# Patient Record
Sex: FEMALE | Race: BLACK OR AFRICAN AMERICAN | Employment: OTHER | URBAN - METROPOLITAN AREA
[De-identification: names, ages, dates, MRNs, and addresses within clinical notes are randomized per-mention and may not be internally consistent; named-entity substitution may affect disease eponyms.]

---

## 2017-01-03 ENCOUNTER — HOSPITAL ENCOUNTER (OUTPATIENT)
Dept: PULMONOLOGY | Age: 52
Discharge: HOME | End: 2017-01-03
Attending: RADIOLOGY | Admitting: RADIOLOGY

## 2017-01-03 DIAGNOSIS — J45.909 UNCOMPLICATED ASTHMA, UNSPECIFIED ASTHMA SEVERITY: ICD-10-CM

## 2017-01-03 DIAGNOSIS — J45.909 UNCOMPLICATED ASTHMA: ICD-10-CM

## 2017-01-03 RX ORDER — ALBUTEROL SULFATE 90 UG/1
4 AEROSOL, METERED RESPIRATORY (INHALATION) ONCE
Status: COMPLETED | OUTPATIENT
Start: 2017-01-03 | End: 2017-01-03

## 2017-01-03 RX ADMIN — ALBUTEROL SULFATE 4 PUFF: 90 AEROSOL, METERED RESPIRATORY (INHALATION) at 13:50

## 2017-01-04 PROCEDURE — 94729 DIFFUSING CAPACITY: CPT | Performed by: INTERNAL MEDICINE

## 2017-01-04 PROCEDURE — 94727 GAS DIL/WSHOT DETER LNG VOL: CPT | Performed by: INTERNAL MEDICINE

## 2017-01-04 PROCEDURE — 94060 EVALUATION OF WHEEZING: CPT | Performed by: INTERNAL MEDICINE

## 2017-01-18 ENCOUNTER — OFFICE VISIT (OUTPATIENT)
Dept: BARIATRICS/WEIGHT MGMT | Age: 52
End: 2017-01-18

## 2017-01-18 VITALS
BODY MASS INDEX: 48.82 KG/M2 | DIASTOLIC BLOOD PRESSURE: 71 MMHG | SYSTOLIC BLOOD PRESSURE: 118 MMHG | WEIGHT: 293 LBS | HEIGHT: 65 IN | HEART RATE: 98 BPM

## 2017-01-18 DIAGNOSIS — I10 ESSENTIAL HYPERTENSION: ICD-10-CM

## 2017-01-18 DIAGNOSIS — E66.01 MORBID OBESITY WITH BMI OF 50.0-59.9, ADULT (HCC): Primary | ICD-10-CM

## 2017-01-18 DIAGNOSIS — E11.9 TYPE 2 DIABETES MELLITUS WITHOUT COMPLICATION, WITH LONG-TERM CURRENT USE OF INSULIN (HCC): ICD-10-CM

## 2017-01-18 DIAGNOSIS — Z79.4 TYPE 2 DIABETES MELLITUS WITHOUT COMPLICATION, WITH LONG-TERM CURRENT USE OF INSULIN (HCC): ICD-10-CM

## 2017-01-18 DIAGNOSIS — E78.5 HYPERLIPIDEMIA, UNSPECIFIED HYPERLIPIDEMIA TYPE: ICD-10-CM

## 2017-01-18 PROCEDURE — 99214 OFFICE O/P EST MOD 30 MIN: CPT | Performed by: SURGERY

## 2017-01-18 RX ORDER — BUPROPION HYDROCHLORIDE 150 MG/1
TABLET ORAL
Refills: 2 | COMMUNITY
Start: 2017-01-10 | End: 2018-08-31

## 2017-01-23 ENCOUNTER — OFFICE VISIT (OUTPATIENT)
Dept: CARDIOLOGY CLINIC | Age: 52
End: 2017-01-23

## 2017-01-23 VITALS
DIASTOLIC BLOOD PRESSURE: 80 MMHG | WEIGHT: 293 LBS | BODY MASS INDEX: 52.92 KG/M2 | HEART RATE: 60 BPM | SYSTOLIC BLOOD PRESSURE: 122 MMHG

## 2017-01-23 DIAGNOSIS — I10 ESSENTIAL HYPERTENSION: ICD-10-CM

## 2017-01-23 DIAGNOSIS — Z01.818 PRE-OP EVALUATION: ICD-10-CM

## 2017-01-23 DIAGNOSIS — R94.31 ABNORMAL EKG: Primary | ICD-10-CM

## 2017-01-23 PROCEDURE — 93000 ELECTROCARDIOGRAM COMPLETE: CPT | Performed by: INTERNAL MEDICINE

## 2017-01-23 PROCEDURE — 99215 OFFICE O/P EST HI 40 MIN: CPT | Performed by: INTERNAL MEDICINE

## 2017-01-23 ASSESSMENT — ENCOUNTER SYMPTOMS
CHOKING: 0
COUGH: 0
SHORTNESS OF BREATH: 0
CHEST TIGHTNESS: 0

## 2017-01-26 ENCOUNTER — HOSPITAL ENCOUNTER (OUTPATIENT)
Dept: CARDIOLOGY | Age: 52
Discharge: HOME | End: 2017-01-26
Attending: INTERNAL MEDICINE | Admitting: INTERNAL MEDICINE

## 2017-01-26 LAB
LV EF: 60 %
LVEF MODALITY: NORMAL

## 2017-02-16 ENCOUNTER — OFFICE VISIT (OUTPATIENT)
Dept: PULMONOLOGY | Age: 52
End: 2017-02-16

## 2017-02-16 VITALS
HEART RATE: 90 BPM | DIASTOLIC BLOOD PRESSURE: 82 MMHG | RESPIRATION RATE: 16 BRPM | SYSTOLIC BLOOD PRESSURE: 122 MMHG | HEIGHT: 65 IN | WEIGHT: 293 LBS | BODY MASS INDEX: 48.82 KG/M2 | OXYGEN SATURATION: 96 %

## 2017-02-16 DIAGNOSIS — F17.210 CIGARETTE NICOTINE DEPENDENCE WITHOUT COMPLICATION: ICD-10-CM

## 2017-02-16 DIAGNOSIS — J45.20 MILD INTERMITTENT ASTHMA WITHOUT COMPLICATION: Primary | ICD-10-CM

## 2017-02-16 PROCEDURE — 99204 OFFICE O/P NEW MOD 45 MIN: CPT | Performed by: INTERNAL MEDICINE

## 2017-02-16 RX ORDER — HYDROCODONE BITARTRATE AND ACETAMINOPHEN 10; 325 MG/1; MG/1
1 TABLET ORAL
COMMUNITY
End: 2022-04-22 | Stop reason: ALTCHOICE

## 2017-02-16 RX ORDER — CANAGLIFLOZIN 300 MG/1
TABLET, FILM COATED ORAL
Refills: 3 | COMMUNITY
Start: 2017-02-03 | End: 2018-08-31

## 2017-03-01 ENCOUNTER — OFFICE VISIT (OUTPATIENT)
Dept: BARIATRICS/WEIGHT MGMT | Age: 52
End: 2017-03-01

## 2017-03-01 VITALS
SYSTOLIC BLOOD PRESSURE: 123 MMHG | BODY MASS INDEX: 48.82 KG/M2 | DIASTOLIC BLOOD PRESSURE: 85 MMHG | WEIGHT: 293 LBS | HEART RATE: 88 BPM | HEIGHT: 65 IN

## 2017-03-01 DIAGNOSIS — E66.01 MORBID OBESITY WITH BMI OF 50.0-59.9, ADULT (HCC): Primary | ICD-10-CM

## 2017-03-01 PROCEDURE — 99214 OFFICE O/P EST MOD 30 MIN: CPT | Performed by: SURGERY

## 2017-03-01 RX ORDER — CYANOCOBALAMIN (VITAMIN B-12) 1000 MCG
1 TABLET, EXTENDED RELEASE ORAL
Qty: 120 TABLET | Refills: 2 | Status: SHIPPED | OUTPATIENT
Start: 2017-03-01

## 2017-03-15 ENCOUNTER — HOSPITAL ENCOUNTER (OUTPATIENT)
Dept: CT IMAGING | Age: 52
Discharge: HOME | End: 2017-03-15
Attending: SURGERY | Admitting: SURGERY

## 2017-03-15 DIAGNOSIS — E66.01 MORBID (SEVERE) OBESITY DUE TO EXCESS CALORIES (HCC): ICD-10-CM

## 2017-03-15 DIAGNOSIS — E66.01 MORBID OBESITY WITH BMI OF 50.0-59.9, ADULT (HCC): ICD-10-CM

## 2017-04-05 ENCOUNTER — OFFICE VISIT (OUTPATIENT)
Dept: BARIATRICS/WEIGHT MGMT | Age: 52
End: 2017-04-05

## 2017-04-05 VITALS
DIASTOLIC BLOOD PRESSURE: 89 MMHG | SYSTOLIC BLOOD PRESSURE: 139 MMHG | HEIGHT: 65 IN | WEIGHT: 293 LBS | HEART RATE: 101 BPM | BODY MASS INDEX: 48.82 KG/M2

## 2017-04-05 DIAGNOSIS — E11.9 TYPE 2 DIABETES MELLITUS WITHOUT COMPLICATION, WITH LONG-TERM CURRENT USE OF INSULIN (HCC): ICD-10-CM

## 2017-04-05 DIAGNOSIS — I10 ESSENTIAL HYPERTENSION: ICD-10-CM

## 2017-04-05 DIAGNOSIS — E66.01 MORBID OBESITY WITH BMI OF 50.0-59.9, ADULT (HCC): Primary | ICD-10-CM

## 2017-04-05 DIAGNOSIS — Z79.4 TYPE 2 DIABETES MELLITUS WITHOUT COMPLICATION, WITH LONG-TERM CURRENT USE OF INSULIN (HCC): ICD-10-CM

## 2017-04-05 DIAGNOSIS — E78.5 HYPERLIPIDEMIA, UNSPECIFIED HYPERLIPIDEMIA TYPE: ICD-10-CM

## 2017-04-05 PROCEDURE — 99214 OFFICE O/P EST MOD 30 MIN: CPT | Performed by: SURGERY

## 2017-04-06 ENCOUNTER — INITIAL CONSULT (OUTPATIENT)
Dept: BARIATRICS/WEIGHT MGMT | Age: 52
End: 2017-04-06

## 2017-04-06 DIAGNOSIS — F34.1: Primary | ICD-10-CM

## 2017-04-06 DIAGNOSIS — E66.01 MORBID OBESITY WITH BMI OF 50.0-59.9, ADULT (HCC): ICD-10-CM

## 2017-04-06 PROCEDURE — 96101 PR PSYCHOLOGIC TESTING BY PSYCH/PHYS: CPT | Performed by: PSYCHOLOGIST

## 2017-04-06 PROCEDURE — 90791 PSYCH DIAGNOSTIC EVALUATION: CPT | Performed by: PSYCHOLOGIST

## 2017-05-17 ENCOUNTER — OFFICE VISIT (OUTPATIENT)
Dept: BARIATRICS/WEIGHT MGMT | Age: 52
End: 2017-05-17

## 2017-05-17 VITALS
HEART RATE: 90 BPM | BODY MASS INDEX: 48.82 KG/M2 | DIASTOLIC BLOOD PRESSURE: 96 MMHG | HEIGHT: 65 IN | SYSTOLIC BLOOD PRESSURE: 141 MMHG | WEIGHT: 293 LBS

## 2017-05-17 DIAGNOSIS — E66.01 MORBID OBESITY WITH BMI OF 50.0-59.9, ADULT (HCC): Primary | ICD-10-CM

## 2017-05-17 DIAGNOSIS — Z79.4 TYPE 2 DIABETES MELLITUS WITHOUT COMPLICATION, WITH LONG-TERM CURRENT USE OF INSULIN (HCC): ICD-10-CM

## 2017-05-17 DIAGNOSIS — I10 ESSENTIAL HYPERTENSION: ICD-10-CM

## 2017-05-17 DIAGNOSIS — E78.5 HYPERLIPIDEMIA, UNSPECIFIED HYPERLIPIDEMIA TYPE: ICD-10-CM

## 2017-05-17 DIAGNOSIS — E11.9 TYPE 2 DIABETES MELLITUS WITHOUT COMPLICATION, WITH LONG-TERM CURRENT USE OF INSULIN (HCC): ICD-10-CM

## 2017-05-17 PROCEDURE — 99214 OFFICE O/P EST MOD 30 MIN: CPT | Performed by: SURGERY

## 2017-06-14 ENCOUNTER — OFFICE VISIT (OUTPATIENT)
Dept: BARIATRICS/WEIGHT MGMT | Age: 52
End: 2017-06-14

## 2017-06-14 VITALS
HEIGHT: 65 IN | DIASTOLIC BLOOD PRESSURE: 78 MMHG | WEIGHT: 293 LBS | BODY MASS INDEX: 48.82 KG/M2 | SYSTOLIC BLOOD PRESSURE: 112 MMHG | HEART RATE: 54 BPM

## 2017-06-14 DIAGNOSIS — E66.01 MORBID OBESITY WITH BMI OF 50.0-59.9, ADULT (HCC): Primary | ICD-10-CM

## 2017-06-14 DIAGNOSIS — I10 ESSENTIAL HYPERTENSION: ICD-10-CM

## 2017-06-14 DIAGNOSIS — E78.5 HYPERLIPIDEMIA, UNSPECIFIED HYPERLIPIDEMIA TYPE: ICD-10-CM

## 2017-06-14 DIAGNOSIS — Z79.4 TYPE 2 DIABETES MELLITUS WITHOUT COMPLICATION, WITH LONG-TERM CURRENT USE OF INSULIN (HCC): ICD-10-CM

## 2017-06-14 DIAGNOSIS — E11.9 TYPE 2 DIABETES MELLITUS WITHOUT COMPLICATION, WITH LONG-TERM CURRENT USE OF INSULIN (HCC): ICD-10-CM

## 2017-06-14 PROCEDURE — 99213 OFFICE O/P EST LOW 20 MIN: CPT | Performed by: SURGERY

## 2017-06-16 DIAGNOSIS — E66.01 MORBID OBESITY WITH BMI OF 50.0-59.9, ADULT (HCC): Primary | ICD-10-CM

## 2017-06-23 ENCOUNTER — HOSPITAL ENCOUNTER (OUTPATIENT)
Dept: ENDOSCOPY | Age: 52
Discharge: HOME | End: 2017-06-23
Attending: SURGERY | Admitting: SURGERY

## 2017-06-23 VITALS
RESPIRATION RATE: 14 BRPM | SYSTOLIC BLOOD PRESSURE: 138 MMHG | TEMPERATURE: 97.7 F | HEART RATE: 81 BPM | DIASTOLIC BLOOD PRESSURE: 88 MMHG | OXYGEN SATURATION: 98 %

## 2017-06-23 LAB
GLUCOSE BLD-MCNC: 217 MG/DL (ref 70–99)
GLUCOSE BLD-MCNC: 232 MG/DL (ref 70–99)
PERFORMED ON: ABNORMAL
PERFORMED ON: ABNORMAL

## 2017-06-23 PROCEDURE — 43239 EGD BIOPSY SINGLE/MULTIPLE: CPT | Performed by: SURGERY

## 2017-06-23 ASSESSMENT — PAIN SCALES - GENERAL
PAINLEVEL_OUTOF10: 0
PAINLEVEL_OUTOF10: 0

## 2017-07-12 ENCOUNTER — OFFICE VISIT (OUTPATIENT)
Dept: BARIATRICS/WEIGHT MGMT | Age: 52
End: 2017-07-12

## 2017-07-12 VITALS
HEIGHT: 65 IN | HEART RATE: 88 BPM | WEIGHT: 293 LBS | DIASTOLIC BLOOD PRESSURE: 91 MMHG | SYSTOLIC BLOOD PRESSURE: 138 MMHG | BODY MASS INDEX: 48.82 KG/M2

## 2017-07-12 DIAGNOSIS — E66.01 MORBID OBESITY WITH BMI OF 50.0-59.9, ADULT (HCC): Primary | ICD-10-CM

## 2017-07-12 DIAGNOSIS — E11.9 TYPE 2 DIABETES MELLITUS WITHOUT COMPLICATION, WITH LONG-TERM CURRENT USE OF INSULIN (HCC): ICD-10-CM

## 2017-07-12 DIAGNOSIS — I10 ESSENTIAL HYPERTENSION: ICD-10-CM

## 2017-07-12 DIAGNOSIS — Z87.891 HISTORY OF NICOTINE USE: ICD-10-CM

## 2017-07-12 DIAGNOSIS — E78.5 HYPERLIPIDEMIA, UNSPECIFIED HYPERLIPIDEMIA TYPE: ICD-10-CM

## 2017-07-12 DIAGNOSIS — Z79.4 TYPE 2 DIABETES MELLITUS WITHOUT COMPLICATION, WITH LONG-TERM CURRENT USE OF INSULIN (HCC): ICD-10-CM

## 2017-07-12 PROCEDURE — 99213 OFFICE O/P EST LOW 20 MIN: CPT | Performed by: SURGERY

## 2017-11-01 ENCOUNTER — OFFICE VISIT (OUTPATIENT)
Dept: BARIATRICS/WEIGHT MGMT | Age: 52
End: 2017-11-01

## 2017-11-01 VITALS
DIASTOLIC BLOOD PRESSURE: 90 MMHG | HEART RATE: 118 BPM | SYSTOLIC BLOOD PRESSURE: 127 MMHG | WEIGHT: 293 LBS | HEIGHT: 65 IN | BODY MASS INDEX: 48.82 KG/M2

## 2017-11-01 DIAGNOSIS — Z79.4 TYPE 2 DIABETES MELLITUS WITHOUT COMPLICATION, WITH LONG-TERM CURRENT USE OF INSULIN (HCC): ICD-10-CM

## 2017-11-01 DIAGNOSIS — I10 ESSENTIAL HYPERTENSION: ICD-10-CM

## 2017-11-01 DIAGNOSIS — E11.9 TYPE 2 DIABETES MELLITUS WITHOUT COMPLICATION, WITH LONG-TERM CURRENT USE OF INSULIN (HCC): ICD-10-CM

## 2017-11-01 DIAGNOSIS — E78.5 HYPERLIPIDEMIA, UNSPECIFIED HYPERLIPIDEMIA TYPE: ICD-10-CM

## 2017-11-01 DIAGNOSIS — E66.01 MORBID OBESITY WITH BMI OF 45.0-49.9, ADULT (HCC): Primary | ICD-10-CM

## 2017-11-01 PROCEDURE — 1036F TOBACCO NON-USER: CPT | Performed by: SURGERY

## 2017-11-01 PROCEDURE — 99213 OFFICE O/P EST LOW 20 MIN: CPT | Performed by: SURGERY

## 2017-11-01 PROCEDURE — G8484 FLU IMMUNIZE NO ADMIN: HCPCS | Performed by: SURGERY

## 2017-11-01 PROCEDURE — G8427 DOCREV CUR MEDS BY ELIG CLIN: HCPCS | Performed by: SURGERY

## 2017-11-01 PROCEDURE — G8417 CALC BMI ABV UP PARAM F/U: HCPCS | Performed by: SURGERY

## 2017-11-01 PROCEDURE — 3046F HEMOGLOBIN A1C LEVEL >9.0%: CPT | Performed by: SURGERY

## 2017-11-01 PROCEDURE — 3014F SCREEN MAMMO DOC REV: CPT | Performed by: SURGERY

## 2017-11-01 PROCEDURE — 3017F COLORECTAL CA SCREEN DOC REV: CPT | Performed by: SURGERY
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
## 2017-11-01 NOTE — PROGRESS NOTES
Disp: , Rfl:     INVOKANA 300 MG TABS tablet, , Disp: , Rfl: 3    HYDROcodone-acetaminophen (NORCO)  MG per tablet, Take 1 tablet by mouth ., Disp: , Rfl:     metFORMIN (GLUCOPHAGE) 1000 MG tablet, Take 1,000 mg by mouth 2 times daily (with meals), Disp: , Rfl:     buPROPion (WELLBUTRIN XL) 150 MG extended release tablet, TK 1 T PO  QD, Disp: , Rfl: 2    atorvastatin (LIPITOR) 40 MG tablet, Take 40 mg by mouth daily, Disp: , Rfl: 0    HYDROcodone-acetaminophen (NORCO)  MG per tablet, 3 times daily . , Disp: , Rfl: 0    albuterol (PROVENTIL HFA) 108 (90 BASE) MCG/ACT inhaler, Inhale 2 puffs into the lungs every 4 hours as needed for Wheezing, Disp: 1 Inhaler, Rfl: 3    amitriptyline (ELAVIL) 75 MG tablet, TAKE 1 TABLET BY MOUTH EVERY NIGHT AT BEDTIME, Disp: 30 tablet, Rfl: 0    albuterol (PROVENTIL HFA) 108 (90 BASE) MCG/ACT inhaler, Inhale 2 puffs into the lungs every 6 hours as needed for Wheezing., Disp: 1 Inhaler, Rfl: 3    atenolol (TENORMIN) 25 MG tablet, Take 25 mg by mouth daily. , Disp: , Rfl:     losartan (COZAAR) 100 MG tablet, Take 100 mg by mouth daily. , Disp: , Rfl:     aspirin 81 MG tablet, Take 81 mg by mouth daily. , Disp: , Rfl:     insulin glargine (LANTUS) 100 UNIT/ML injection, Inject 45 Units into the skin nightly., Disp: , Rfl:       Review of Systems - History obtained from the patient  General ROS: negative  Psychological ROS: negative  Ophthalmic ROS: negative  Neurological ROS: negative  ENT ROS: negative  Allergy and Immunology ROS: negative  Hematological and Lymphatic ROS: negative  Endocrine ROS: Adrenal abnormality  Respiratory ROS: negative  Cardiovascular ROS: negative  Gastrointestinal ROS:negative  Genito-Urinary ROS: negative  Musculoskeletal ROS: negative   Skin ROS: negative    Physical Exam   Vitals Reviewed   Constitutional: Patient is oriented to person, place, and time. Patient appears well-developed and well-nourished.  Patient is active and nicotine dependence without complication    Chronic superficial gastritis without bleeding    and importance of weight loss to alleviate those co morbid conditions. I encouraged the patient to continue exercise and keeping healthy eating habits. Discussed pre-op labs and work up till now. Also counseled the patient extensively on Surgery. 15 minutes spent counseling the patient, answering questions and addressing concerns as well reviewing labs and/or other studies as well coordinating care. More than 50% was face to face time counseling the patient. RTC in 3 months  Needs to be stable from adrenal standpoint  Obtain rest of pre-op work up / clearances  Diet and Exercise      Patient advised that its their responsibility to follow up for studies and/or labs ordered today.      Santhosh Slider, DO

## 2018-08-16 NOTE — PROGRESS NOTES
[facility] / [facility] ([facility]) [address] [facility], [address]    Acknowledgment of Informed Consent for Surgical or Medical Procedure and Sedation  I agree to allow doctor(s) Bailey Tavarez and his/her associates or assistants, including residents and/or other qualified medical practitioner to perform the following medical treatment or procedure and to administer or direct the administration of sedation as necessary:  Procedure(s): REPLACEMENT / [address]  My doctor has explained the following regarding the proposed procedure:   the explanation of the procedure   the benefits of the procedure   the potential problems that might occur during recuperation   the risks and side effects of the procedure which could include but are not limited to severe blood loss, infection, stroke or death   the benefits, risks and side effect of alternative procedures including the consequences of declining this procedure or any alternative procedures   the likelihood of achieving satisfactory results. I acknowledge no guarantee or assurance has been made to me regarding the results. I understand that during the course of this treatment/procedure, unforeseen conditions can occur which require an additional or different procedure. I agree to allow my physician or assistants to perform such extension of the original procedure as they may find necessary. I understand that sedation will often result in temporary impairment of memory and fine motor skills and that sedation can occasionally progress to a state of deep sedation or general anesthesia. I understand the risks of anesthesia for surgery include, but are not limited to, sore throat, hoarseness, injury to face, mouth, or teeth; nausea; headache; injury to blood vessels or nerves; death, brain damage, or paralysis.     I understand that if I have a Limitation of Treatment order in effect during my hospitalization, the

## 2018-08-20 ENCOUNTER — ANESTHESIA EVENT (OUTPATIENT)
Dept: OPERATING ROOM | Age: 53
End: 2018-08-20
Payer: COMMERCIAL

## 2018-08-20 NOTE — PROGRESS NOTES
PRE-OP INSTRUCTIONS FOR THE SURGICAL PATIENT YOU ARE UNABLE TO MAKE CONTACT FOR AN INTERVIEW:      1. Follow instructions for your ARRIVAL TIME as DIRECTED BY YOUR SURGEON. 2. Enter the MAIN entrance located on [address] and report to the desk. 3. Bring your insurance & prescription card and photo ID with you. You may also be asked to pay a co-pay, as you may want to bring a check or credit card with you. 4. Leave all other valuables at home. 5. Arrange for someone to drive you home and be with you for the first 24 hours after discharge. 6. You must contact your surgeon for ALL medication instructions, especially if taking blood thinners, aspirin, or diabetic medication. 7. A Pre-op History and Physical for surgery MUST be completed by your Physician or an Urgent Care within 30 days of your procedure date. Please bring a copy with you on the day of your procedure and along with any other testing performed. 8. DO NOT EAT OR DRINK ANYTHING AFTER MIDNIGHT, including gum, candy, mints or ice chips   9. Dress in loose, comfortable clothing appropriate for redressing after your procedure. Do not wear jewelry (including body piercings), make-up, fingernail polish, lotion, powders or metal hairclips. Contacts will need to be removed prior to surgery. 10. If you use a CPAP, please bring it with you on the day of your procedure. 11. Do not shave or wax for 72 hours prior to procedure near your operative site  12. FOR WOMAN OF CHILDBEARING AGE ONLY- please bring a urine sample with you on day of surgery or make sure we can collect on arrival.    If you have further questions, you may contact us at [phone]    Left instructions on patient's voicemail. Rosio Servin. 8/20/2018 .2:58 PM    H&P IN CARE EVERYWHERE (8/16/). NO LAS, EKG. [facility] FOR [facility] THAT TESTING WOULD HAVE TO BE DONE DOS, SINCE NOT DONE AT PCP. NO ANSWER AT MAIN LINE OF [place]. LABS AND EKG ORDERED FOR DOS.     [facility] FOR [facility] THAT ANCEF SHOULD BE 3 GRAMS DUE TO WEIGHT >120KG.

## 2018-08-21 ENCOUNTER — HOSPITAL ENCOUNTER (OUTPATIENT)
Age: 53
Setting detail: OUTPATIENT SURGERY
Discharge: HOME HEALTH | End: 2018-08-21
Attending: NEUROLOGICAL SURGERY | Admitting: NEUROLOGICAL SURGERY
Payer: COMMERCIAL

## 2018-08-21 ENCOUNTER — ANESTHESIA (OUTPATIENT)
Dept: OPERATING ROOM | Age: 53
End: 2018-08-21
Payer: COMMERCIAL

## 2018-08-21 VITALS
DIASTOLIC BLOOD PRESSURE: 69 MMHG | SYSTOLIC BLOOD PRESSURE: 121 MMHG | RESPIRATION RATE: 23 BRPM | OXYGEN SATURATION: 99 %

## 2018-08-21 VITALS
HEIGHT: 65 IN | OXYGEN SATURATION: 92 % | SYSTOLIC BLOOD PRESSURE: 109 MMHG | RESPIRATION RATE: 18 BRPM | WEIGHT: 282 LBS | HEART RATE: 85 BPM | DIASTOLIC BLOOD PRESSURE: 75 MMHG | TEMPERATURE: 97.5 F | BODY MASS INDEX: 46.98 KG/M2

## 2018-08-21 LAB
ANION GAP SERPL CALCULATED.3IONS-SCNC: 13 MMOL/L (ref 3–16)
APTT: 25.5 SEC (ref 26–36)
BASOPHILS ABSOLUTE: 0.2 K/UL (ref 0–0.2)
BASOPHILS RELATIVE PERCENT: 2.2 %
BUN BLDV-MCNC: 11 MG/DL (ref 7–20)
CALCIUM SERPL-MCNC: 9.4 MG/DL (ref 8.3–10.6)
CHLORIDE BLD-SCNC: 104 MMOL/L (ref 99–110)
CO2: 23 MMOL/L (ref 21–32)
CREAT SERPL-MCNC: 0.8 MG/DL (ref 0.6–1.1)
EOSINOPHILS ABSOLUTE: 0.5 K/UL (ref 0–0.6)
EOSINOPHILS RELATIVE PERCENT: 4.7 %
GFR AFRICAN AMERICAN: >60
GFR NON-AFRICAN AMERICAN: >60
GLUCOSE BLD-MCNC: 102 MG/DL (ref 70–99)
GLUCOSE BLD-MCNC: 107 MG/DL (ref 70–99)
GLUCOSE BLD-MCNC: 109 MG/DL (ref 70–99)
HCT VFR BLD CALC: 36.9 % (ref 36–48)
HEMOGLOBIN: 12.1 G/DL (ref 12–16)
INR BLD: 0.91 (ref 0.86–1.14)
LYMPHOCYTES ABSOLUTE: 3.9 K/UL (ref 1–5.1)
LYMPHOCYTES RELATIVE PERCENT: 39.3 %
MCH RBC QN AUTO: 28.7 PG (ref 26–34)
MCHC RBC AUTO-ENTMCNC: 32.7 G/DL (ref 31–36)
MCV RBC AUTO: 87.8 FL (ref 80–100)
MONOCYTES ABSOLUTE: 0.8 K/UL (ref 0–1.3)
MONOCYTES RELATIVE PERCENT: 8.1 %
NEUTROPHILS ABSOLUTE: 4.5 K/UL (ref 1.7–7.7)
NEUTROPHILS RELATIVE PERCENT: 45.7 %
PDW BLD-RTO: 14 % (ref 12.4–15.4)
PERFORMED ON: ABNORMAL
PERFORMED ON: ABNORMAL
PLATELET # BLD: 421 K/UL (ref 135–450)
PMV BLD AUTO: 8.4 FL (ref 5–10.5)
POTASSIUM SERPL-SCNC: 3.9 MMOL/L (ref 3.5–5.1)
PROTHROMBIN TIME: 10.4 SEC (ref 9.8–13)
RBC # BLD: 4.2 M/UL (ref 4–5.2)
SODIUM BLD-SCNC: 140 MMOL/L (ref 136–145)
WBC # BLD: 9.9 K/UL (ref 4–11)

## 2018-08-21 PROCEDURE — 6370000000 HC RX 637 (ALT 250 FOR IP)

## 2018-08-21 PROCEDURE — 2580000003 HC RX 258: Performed by: NEUROLOGICAL SURGERY

## 2018-08-21 PROCEDURE — 7100000000 HC PACU RECOVERY - FIRST 15 MIN: Performed by: NEUROLOGICAL SURGERY

## 2018-08-21 PROCEDURE — 2580000003 HC RX 258: Performed by: ANESTHESIOLOGY

## 2018-08-21 PROCEDURE — 7100000001 HC PACU RECOVERY - ADDTL 15 MIN: Performed by: NEUROLOGICAL SURGERY

## 2018-08-21 PROCEDURE — 6360000002 HC RX W HCPCS: Performed by: NURSE ANESTHETIST, CERTIFIED REGISTERED

## 2018-08-21 PROCEDURE — C1820 GENERATOR NEURO RECHG BAT SY: HCPCS | Performed by: NEUROLOGICAL SURGERY

## 2018-08-21 PROCEDURE — 85025 COMPLETE CBC W/AUTO DIFF WBC: CPT

## 2018-08-21 PROCEDURE — L8689 EXTERNAL RECHARG SYS INTERN: HCPCS | Performed by: NEUROLOGICAL SURGERY

## 2018-08-21 PROCEDURE — 6360000002 HC RX W HCPCS: Performed by: NEUROLOGICAL SURGERY

## 2018-08-21 PROCEDURE — 2500000003 HC RX 250 WO HCPCS: Performed by: NEUROLOGICAL SURGERY

## 2018-08-21 PROCEDURE — 80048 BASIC METABOLIC PNL TOTAL CA: CPT

## 2018-08-21 PROCEDURE — 2709999900 HC NON-CHARGEABLE SUPPLY: Performed by: NEUROLOGICAL SURGERY

## 2018-08-21 PROCEDURE — 36415 COLL VENOUS BLD VENIPUNCTURE: CPT

## 2018-08-21 PROCEDURE — 85730 THROMBOPLASTIN TIME PARTIAL: CPT

## 2018-08-21 PROCEDURE — 7100000011 HC PHASE II RECOVERY - ADDTL 15 MIN: Performed by: NEUROLOGICAL SURGERY

## 2018-08-21 PROCEDURE — 3700000000 HC ANESTHESIA ATTENDED CARE: Performed by: NEUROLOGICAL SURGERY

## 2018-08-21 PROCEDURE — 6360000002 HC RX W HCPCS: Performed by: ANESTHESIOLOGY

## 2018-08-21 PROCEDURE — 2500000003 HC RX 250 WO HCPCS

## 2018-08-21 PROCEDURE — 93005 ELECTROCARDIOGRAM TRACING: CPT | Performed by: NEUROLOGICAL SURGERY

## 2018-08-21 PROCEDURE — 2500000003 HC RX 250 WO HCPCS: Performed by: NURSE ANESTHETIST, CERTIFIED REGISTERED

## 2018-08-21 PROCEDURE — 2580000003 HC RX 258

## 2018-08-21 PROCEDURE — 3600000012 HC SURGERY LEVEL 2 ADDTL 15MIN: Performed by: NEUROLOGICAL SURGERY

## 2018-08-21 PROCEDURE — 2720000010 HC SURG SUPPLY STERILE: Performed by: NEUROLOGICAL SURGERY

## 2018-08-21 PROCEDURE — 3700000001 HC ADD 15 MINUTES (ANESTHESIA): Performed by: NEUROLOGICAL SURGERY

## 2018-08-21 PROCEDURE — 6360000002 HC RX W HCPCS

## 2018-08-21 PROCEDURE — 6370000000 HC RX 637 (ALT 250 FOR IP): Performed by: NEUROLOGICAL SURGERY

## 2018-08-21 PROCEDURE — 85610 PROTHROMBIN TIME: CPT

## 2018-08-21 PROCEDURE — 7100000010 HC PHASE II RECOVERY - FIRST 15 MIN: Performed by: NEUROLOGICAL SURGERY

## 2018-08-21 PROCEDURE — 3600000002 HC SURGERY LEVEL 2 BASE: Performed by: NEUROLOGICAL SURGERY

## 2018-08-21 PROCEDURE — C1787 PATIENT PROGR, NEUROSTIM: HCPCS | Performed by: NEUROLOGICAL SURGERY

## 2018-08-21 DEVICE — DEVICE NEUROSTIMULATOR 13.9CC W1.9XH2.2IN 29.1GM RECHRG: Type: IMPLANTABLE DEVICE | Site: SPINE LUMBAR | Status: FUNCTIONAL

## 2018-08-21 RX ORDER — LIDOCAINE HYDROCHLORIDE 40 MG/ML
SOLUTION TOPICAL PRN
Status: DISCONTINUED | OUTPATIENT
Start: 2018-08-21 | End: 2018-08-21 | Stop reason: SDUPTHER

## 2018-08-21 RX ORDER — MEPERIDINE HYDROCHLORIDE 25 MG/ML
12.5 INJECTION INTRAMUSCULAR; INTRAVENOUS; SUBCUTANEOUS EVERY 5 MIN PRN
Status: DISCONTINUED | OUTPATIENT
Start: 2018-08-21 | End: 2018-08-21 | Stop reason: HOSPADM

## 2018-08-21 RX ORDER — SODIUM CHLORIDE, SODIUM LACTATE, POTASSIUM CHLORIDE, CALCIUM CHLORIDE 600; 310; 30; 20 MG/100ML; MG/100ML; MG/100ML; MG/100ML
INJECTION, SOLUTION INTRAVENOUS CONTINUOUS PRN
Status: DISCONTINUED | OUTPATIENT
Start: 2018-08-21 | End: 2018-08-21 | Stop reason: SDUPTHER

## 2018-08-21 RX ORDER — SODIUM CHLORIDE 0.9 % (FLUSH) 0.9 %
10 SYRINGE (ML) INJECTION PRN
Status: DISCONTINUED | OUTPATIENT
Start: 2018-08-21 | End: 2018-08-21 | Stop reason: HOSPADM

## 2018-08-21 RX ORDER — SODIUM CHLORIDE, SODIUM LACTATE, POTASSIUM CHLORIDE, CALCIUM CHLORIDE 600; 310; 30; 20 MG/100ML; MG/100ML; MG/100ML; MG/100ML
INJECTION, SOLUTION INTRAVENOUS CONTINUOUS
Status: DISCONTINUED | OUTPATIENT
Start: 2018-08-21 | End: 2018-08-21 | Stop reason: HOSPADM

## 2018-08-21 RX ORDER — MORPHINE SULFATE 2 MG/ML
2 INJECTION, SOLUTION INTRAMUSCULAR; INTRAVENOUS EVERY 5 MIN PRN
Status: DISCONTINUED | OUTPATIENT
Start: 2018-08-21 | End: 2018-08-21 | Stop reason: HOSPADM

## 2018-08-21 RX ORDER — SODIUM CHLORIDE 0.9 % (FLUSH) 0.9 %
10 SYRINGE (ML) INJECTION EVERY 12 HOURS SCHEDULED
Status: DISCONTINUED | OUTPATIENT
Start: 2018-08-21 | End: 2018-08-21 | Stop reason: HOSPADM

## 2018-08-21 RX ORDER — NEOSTIGMINE METHYLSULFATE 5 MG/5 ML
SYRINGE (ML) INTRAVENOUS PRN
Status: DISCONTINUED | OUTPATIENT
Start: 2018-08-21 | End: 2018-08-21 | Stop reason: SDUPTHER

## 2018-08-21 RX ORDER — PROPOFOL 10 MG/ML
INJECTION, EMULSION INTRAVENOUS PRN
Status: DISCONTINUED | OUTPATIENT
Start: 2018-08-21 | End: 2018-08-21 | Stop reason: SDUPTHER

## 2018-08-21 RX ORDER — MIDAZOLAM HYDROCHLORIDE 1 MG/ML
2 INJECTION INTRAMUSCULAR; INTRAVENOUS ONCE
Status: COMPLETED | OUTPATIENT
Start: 2018-08-21 | End: 2018-08-21

## 2018-08-21 RX ORDER — ROCURONIUM BROMIDE 10 MG/ML
INJECTION, SOLUTION INTRAVENOUS PRN
Status: DISCONTINUED | OUTPATIENT
Start: 2018-08-21 | End: 2018-08-21 | Stop reason: SDUPTHER

## 2018-08-21 RX ORDER — AMLODIPINE BESYLATE 10 MG/1
10 TABLET ORAL DAILY
COMMUNITY

## 2018-08-21 RX ORDER — LABETALOL HYDROCHLORIDE 5 MG/ML
5 INJECTION, SOLUTION INTRAVENOUS EVERY 10 MIN PRN
Status: DISCONTINUED | OUTPATIENT
Start: 2018-08-21 | End: 2018-08-21 | Stop reason: HOSPADM

## 2018-08-21 RX ORDER — ONDANSETRON 2 MG/ML
4 INJECTION INTRAMUSCULAR; INTRAVENOUS
Status: DISCONTINUED | OUTPATIENT
Start: 2018-08-21 | End: 2018-08-21 | Stop reason: HOSPADM

## 2018-08-21 RX ORDER — MIDAZOLAM HYDROCHLORIDE 1 MG/ML
INJECTION INTRAMUSCULAR; INTRAVENOUS
Status: DISCONTINUED
Start: 2018-08-21 | End: 2018-08-21 | Stop reason: HOSPADM

## 2018-08-21 RX ORDER — ONDANSETRON 2 MG/ML
INJECTION INTRAMUSCULAR; INTRAVENOUS PRN
Status: DISCONTINUED | OUTPATIENT
Start: 2018-08-21 | End: 2018-08-21 | Stop reason: SDUPTHER

## 2018-08-21 RX ORDER — MIDAZOLAM HYDROCHLORIDE 1 MG/ML
INJECTION INTRAMUSCULAR; INTRAVENOUS PRN
Status: DISCONTINUED | OUTPATIENT
Start: 2018-08-21 | End: 2018-08-21 | Stop reason: SDUPTHER

## 2018-08-21 RX ORDER — LIDOCAINE HYDROCHLORIDE 10 MG/ML
1 INJECTION, SOLUTION EPIDURAL; INFILTRATION; INTRACAUDAL; PERINEURAL
Status: DISCONTINUED | OUTPATIENT
Start: 2018-08-21 | End: 2018-08-21 | Stop reason: HOSPADM

## 2018-08-21 RX ORDER — DEXAMETHASONE SODIUM PHOSPHATE 4 MG/ML
INJECTION, SOLUTION INTRA-ARTICULAR; INTRALESIONAL; INTRAMUSCULAR; INTRAVENOUS; SOFT TISSUE PRN
Status: DISCONTINUED | OUTPATIENT
Start: 2018-08-21 | End: 2018-08-21 | Stop reason: SDUPTHER

## 2018-08-21 RX ORDER — GLYCOPYRROLATE 1 MG/5 ML
SYRINGE (ML) INTRAVENOUS PRN
Status: DISCONTINUED | OUTPATIENT
Start: 2018-08-21 | End: 2018-08-21 | Stop reason: SDUPTHER

## 2018-08-21 RX ORDER — FENTANYL CITRATE 50 UG/ML
50 INJECTION, SOLUTION INTRAMUSCULAR; INTRAVENOUS EVERY 5 MIN PRN
Status: DISCONTINUED | OUTPATIENT
Start: 2018-08-21 | End: 2018-08-21 | Stop reason: HOSPADM

## 2018-08-21 RX ORDER — FENTANYL CITRATE 50 UG/ML
INJECTION, SOLUTION INTRAMUSCULAR; INTRAVENOUS PRN
Status: DISCONTINUED | OUTPATIENT
Start: 2018-08-21 | End: 2018-08-21 | Stop reason: SDUPTHER

## 2018-08-21 RX ORDER — LIDOCAINE HYDROCHLORIDE 10 MG/ML
INJECTION, SOLUTION INFILTRATION; PERINEURAL
Status: COMPLETED
Start: 2018-08-21 | End: 2018-08-21

## 2018-08-21 RX ADMIN — ONDANSETRON 4 MG: 2 INJECTION INTRAMUSCULAR; INTRAVENOUS at 13:53

## 2018-08-21 RX ADMIN — Medication 0.4 MG: at 13:56

## 2018-08-21 RX ADMIN — ROCURONIUM BROMIDE 40 MG: 10 INJECTION, SOLUTION INTRAVENOUS at 13:08

## 2018-08-21 RX ADMIN — MUPIROCIN: 20 OINTMENT TOPICAL at 11:44

## 2018-08-21 RX ADMIN — FENTANYL CITRATE 50 MCG: 50 INJECTION INTRAMUSCULAR; INTRAVENOUS at 13:10

## 2018-08-21 RX ADMIN — DEXAMETHASONE SODIUM PHOSPHATE 4 MG: 4 INJECTION, SOLUTION INTRAMUSCULAR; INTRAVENOUS at 13:53

## 2018-08-21 RX ADMIN — SODIUM CHLORIDE, POTASSIUM CHLORIDE, SODIUM LACTATE AND CALCIUM CHLORIDE: 600; 310; 30; 20 INJECTION, SOLUTION INTRAVENOUS at 12:19

## 2018-08-21 RX ADMIN — Medication 2 MG: at 13:56

## 2018-08-21 RX ADMIN — SUGAMMADEX 200 MG: 100 INJECTION, SOLUTION INTRAVENOUS at 14:04

## 2018-08-21 RX ADMIN — FENTANYL CITRATE 50 MCG: 50 INJECTION INTRAMUSCULAR; INTRAVENOUS at 14:00

## 2018-08-21 RX ADMIN — LIDOCAINE HYDROCHLORIDE 120 MG: 40 SOLUTION TOPICAL at 13:08

## 2018-08-21 RX ADMIN — LIDOCAINE HYDROCHLORIDE 200 MG: 10 INJECTION, SOLUTION INFILTRATION; PERINEURAL at 12:22

## 2018-08-21 RX ADMIN — Medication 0.5 MG: at 14:02

## 2018-08-21 RX ADMIN — SODIUM CHLORIDE, SODIUM LACTATE, POTASSIUM CHLORIDE, AND CALCIUM CHLORIDE: 600; 310; 30; 20 INJECTION, SOLUTION INTRAVENOUS at 12:37

## 2018-08-21 RX ADMIN — PROPOFOL 200 MG: 10 INJECTION, EMULSION INTRAVENOUS at 13:07

## 2018-08-21 RX ADMIN — MIDAZOLAM 2 MG: 1 INJECTION INTRAMUSCULAR; INTRAVENOUS at 12:20

## 2018-08-21 RX ADMIN — HYDROMORPHONE HYDROCHLORIDE 0.5 MG: 1 INJECTION, SOLUTION INTRAMUSCULAR; INTRAVENOUS; SUBCUTANEOUS at 14:57

## 2018-08-21 RX ADMIN — DEXTROSE MONOHYDRATE 3 G: 50 INJECTION, SOLUTION INTRAVENOUS at 13:17

## 2018-08-21 RX ADMIN — MIDAZOLAM HYDROCHLORIDE 2 MG: 1 INJECTION INTRAMUSCULAR; INTRAVENOUS at 12:37

## 2018-08-21 RX ADMIN — Medication 3 MG: at 14:02

## 2018-08-21 ASSESSMENT — PAIN - FUNCTIONAL ASSESSMENT: PAIN_FUNCTIONAL_ASSESSMENT: 0-10

## 2018-08-21 ASSESSMENT — PULMONARY FUNCTION TESTS
PIF_VALUE: 23
PIF_VALUE: 1
PIF_VALUE: 16
PIF_VALUE: 23
PIF_VALUE: 12
PIF_VALUE: 1
PIF_VALUE: 25
PIF_VALUE: 1
PIF_VALUE: 22
PIF_VALUE: 16
PIF_VALUE: 21
PIF_VALUE: 1
PIF_VALUE: 17
PIF_VALUE: 16
PIF_VALUE: 24
PIF_VALUE: 1
PIF_VALUE: 1
PIF_VALUE: 23
PIF_VALUE: 1
PIF_VALUE: 23
PIF_VALUE: 23
PIF_VALUE: 24
PIF_VALUE: 21
PIF_VALUE: 1
PIF_VALUE: 22
PIF_VALUE: 1
PIF_VALUE: 24
PIF_VALUE: 18
PIF_VALUE: 1
PIF_VALUE: 2
PIF_VALUE: 7
PIF_VALUE: 2
PIF_VALUE: 22
PIF_VALUE: 1
PIF_VALUE: 1
PIF_VALUE: 21
PIF_VALUE: 21
PIF_VALUE: 23
PIF_VALUE: 1
PIF_VALUE: 1
PIF_VALUE: 21
PIF_VALUE: 1
PIF_VALUE: 3
PIF_VALUE: 21
PIF_VALUE: 1
PIF_VALUE: 1
PIF_VALUE: 23
PIF_VALUE: 23
PIF_VALUE: 1
PIF_VALUE: 23
PIF_VALUE: 1
PIF_VALUE: 26
PIF_VALUE: 32
PIF_VALUE: 25
PIF_VALUE: 16
PIF_VALUE: 22
PIF_VALUE: 27
PIF_VALUE: 1
PIF_VALUE: 1
PIF_VALUE: 16
PIF_VALUE: 1
PIF_VALUE: 15
PIF_VALUE: 22
PIF_VALUE: 29
PIF_VALUE: 23
PIF_VALUE: 24
PIF_VALUE: 16
PIF_VALUE: 1
PIF_VALUE: 1
PIF_VALUE: 8
PIF_VALUE: 21
PIF_VALUE: 23
PIF_VALUE: 5
PIF_VALUE: 23
PIF_VALUE: 19
PIF_VALUE: 21
PIF_VALUE: 24
PIF_VALUE: 30
PIF_VALUE: 1
PIF_VALUE: 21
PIF_VALUE: 23
PIF_VALUE: 1
PIF_VALUE: 1
PIF_VALUE: 18
PIF_VALUE: 1
PIF_VALUE: 2
PIF_VALUE: 21
PIF_VALUE: 21
PIF_VALUE: 1
PIF_VALUE: 23
PIF_VALUE: 8
PIF_VALUE: 1
PIF_VALUE: 23

## 2018-08-21 ASSESSMENT — PAIN SCALES - GENERAL
PAINLEVEL_OUTOF10: 0
PAINLEVEL_OUTOF10: 6
PAINLEVEL_OUTOF10: 0

## 2018-08-21 NOTE — ANESTHESIA PRE PROCEDURE
the lungs every 4 hours as needed for Wheezing 7/28/15   La Nena Hernandez MD   albuterol (PROVENTIL HFA) 108 (90 BASE) MCG/ACT inhaler Inhale 2 puffs into the lungs every 6 hours as needed for Wheezing. 12/10/14   Ashley Evans MD   atenolol (TENORMIN) 25 MG tablet Take 25 mg by mouth daily.     Historical Provider, MD       Current medications:    Current Facility-Administered Medications   Medication Dose Route Frequency Provider Last Rate Last Dose    ceFAZolin (ANCEF) 3 g in dextrose 5 % 100 mL IVPB  3 g Intravenous Once Panchito Cash MD        mupirocin (BACTROBAN) 2 % ointment   Topical Daily Panchito Cash MD        lactated ringers infusion   Intravenous Continuous Ana Paula Morris MD        sodium chloride flush 0.9 % injection 10 mL  10 mL Intravenous 2 times per day Ana Paula Morris MD        sodium chloride flush 0.9 % injection 10 mL  10 mL Intravenous PRN Ana Paula Morris MD        lidocaine PF 1 % injection 1 mL  1 mL Intradermal Once PRN Ana Paula Morris MD           Allergies:  No Known Allergies    Problem List:    Patient Active Problem List   Diagnosis Code    Pneumonia J18.9    Morbid obesity with BMI of 50.0-59.9, adult ([facility] E66.01, Z68.43    Essential hypertension I10    Hyperlipidemia E78.5    Type 2 diabetes mellitus without complication, with long-term current use of insulin ([facility] E11.9, E62.6    Uncomplicated asthma U65.187    Cigarette nicotine dependence without complication D17.598    Chronic superficial gastritis without bleeding K29.30       Past Medical History:        Diagnosis Date    Asthma     Back pain     Cerebral artery occlusion with cerebral infarction ([address]     Diabetes mellitus ([address]     Hyperlipidemia     Hypertension     Spinal cord stimulator status        Past Surgical History:        Procedure Laterality Date    ENDOSCOPY, COLON, DIAGNOSTIC      OTHER SURGICAL HISTORY  06/23/2017    EGD with biopsy    PANCREAS SURGERY      ROTATOR CUFF

## 2018-08-21 NOTE — H&P
mouth .   Yes Historical Provider, MD   metFORMIN (GLUCOPHAGE) 1000 MG tablet Take 1,000 mg by mouth 2 times daily (with meals)   Yes Historical Provider, MD   buPROPion (WELLBUTRIN XL) 150 MG extended release tablet TK 1 T PO  QD 1/10/17  Yes Historical Provider, MD   atorvastatin (LIPITOR) 40 MG tablet Take 40 mg by mouth daily 11/15/16  Yes Historical Provider, MD   HYDROcodone-acetaminophen (NORCO)  MG per tablet 3 times daily . 12/15/16  Yes Historical Provider, MD   amitriptyline (ELAVIL) 75 MG tablet TAKE 1 TABLET BY MOUTH EVERY NIGHT AT BEDTIME 4/16/15  Yes Jordan Lenz MD   losartan (COZAAR) 100 MG tablet Take 100 mg by mouth daily. Yes Historical Provider, MD   aspirin 81 MG tablet Take 81 mg by mouth daily. Yes Historical Provider, MD   insulin glargine (LANTUS) 100 UNIT/ML injection Inject 45 Units into the skin nightly. Yes Historical Provider, MD   calcium citrate-vitamin D (CITRICAL + D) 315-250 MG-UNIT TABS per tablet Take 1 tablet by mouth daily (with breakfast) 3/1/17   Laila Ortiz DO   albuterol (PROVENTIL HFA) 108 (90 BASE) MCG/ACT inhaler Inhale 2 puffs into the lungs every 4 hours as needed for Wheezing 7/28/15   Shirley Clemons MD   albuterol (PROVENTIL HFA) 108 (90 BASE) MCG/ACT inhaler Inhale 2 puffs into the lungs every 6 hours as needed for Wheezing. 12/10/14   Ruslan Falcon MD   atenolol (TENORMIN) 25 MG tablet Take 25 mg by mouth daily. Historical Provider, MD         Allergies:  Patient has no known allergies.     PHYSICAL EXAM:      /84   Pulse 90   Temp 98 °F (36.7 °C) (Oral)   Resp 18   Ht 5' 5\" (1.651 m)   Wt 282 lb (127.9 kg)   SpO2 96%   BMI 46.93 kg/m²      Heart:  regular rate and rhythm, no murmur    Lungs:  No increased work of breathing, good air exchange, clear to auscultation bilaterally, no crackles or wheezing    Abdomen:  soft, non-distended, non-tender, no rebound tenderness or guarding, normal active bowel sounds and no masses palpated    ASSESSMENT AND PLAN:    1. Patient seen and focused exam done today- no new changes since last physical exam on 8-    2. Access to ancillary services are available per request of the provider.     Brooklyn Correia, [address]     8/21/2018

## 2018-08-22 LAB
EKG ATRIAL RATE: 97 BPM
EKG DIAGNOSIS: NORMAL
EKG P AXIS: 62 DEGREES
EKG P-R INTERVAL: 162 MS
EKG Q-T INTERVAL: 370 MS
EKG QRS DURATION: 68 MS
EKG QTC CALCULATION (BAZETT): 469 MS
EKG R AXIS: -8 DEGREES
EKG T AXIS: 35 DEGREES
EKG VENTRICULAR RATE: 97 BPM

## 2018-08-22 PROCEDURE — 93010 ELECTROCARDIOGRAM REPORT: CPT | Performed by: INTERNAL MEDICINE

## 2018-08-22 NOTE — OP NOTE
[address]                 [address] [place], [address]                                 OPERATIVE REPORT    PATIENT NAME: Steven Hernandez                    :        1965  MED REC NO:   [id number]                          ROOM:  ACCOUNT NO:   [de-identified]                           ADMIT DATE: 2018  PROVIDER:     Surekha Lao MD    DATE OF PROCEDURE:  2018    PREOPERATIVE DIAGNOSES:  Chronic back pain, failed spinal cord stimulator. POSTOPERATIVE DIAGNOSES:  Chronic back pain, failed spinal cord stimulator. OPERATION PERFORMED:  Removal of replacement revision of left hip spinal  cord stimulator battery with intraoperative electronic analysis. OPERATING SURGEON:  Surekha Lao MD    ANESTHESIA:  General endotracheal.    ESTIMATED BLOOD LOSS:  Less than 20 mL. INDICATIONS:  This 78-year-old -American female with history of a  [employer] spinal cord stimulator that is non-functioning. Battery is old  and depleted. She wishes new battery and then she also wishes to have  battery moved in a new position. New position is thought to be a few  inches higher and more lateral.  In fact that the new battery will be the  Intellis, it is smaller battery, it will also be more comfortable. She  understands this and agrees to proceed in this fashion. PROCEDURE IN DETAIL:  The patient was brought to the operating room. General endotracheal anesthesia induced. The patient was placed prone. The area of the previous battery was noted, the incision was delineated,  new position was then marked, this was first marked with the patient awake  and agreeing to the new position. The area was prepped out and draped in  sterile fashion. Two incisions were delineated and infiltrated with local  anesthetic.   Incision was made with the old battery with a PlasmaBlade down  to the _____ advance battery was then removed without difficulty. No  problem with any of the wiring was noted. New incision was made using a  PlasmaBlade down and subcutaneous dissection was then performed for an  Intellis battery. Wiring was then dissected and tunneling of the wires  into new pocket was performed without difficulty and connection to the  Intellis was then performed. Intraoperative electronic analysis  demonstrates all 16 contacts show nominal impendence. Wounds were then  copiously irrigated with antibiotic irrigation and closure with the battery  was actually tacked down using 0 Vicryl suture and the remainder closed  using two interrupted Vicryl suture, three interrupted 4-0 Monocryl, and  Dermabond for the skin. Both incisions were closed in similar fashion. The patient was flipped on her back and taken to the            recovery room in good and stable condition. I certify that I was present  and available for the entire procedure.         Murphy Vann MD    D: 08/21/2018 14:19:22       T: 08/21/2018 15:43:21     [email or web address]  Job#: [id number]     Doc#: [id number]    CC:

## 2018-08-31 ENCOUNTER — HOSPITAL ENCOUNTER (EMERGENCY)
Age: 53
Discharge: HOME | End: 2018-08-31
Attending: EMERGENCY MEDICINE
Payer: COMMERCIAL

## 2018-08-31 ENCOUNTER — APPOINTMENT (OUTPATIENT)
Dept: CT IMAGING | Age: 53
End: 2018-08-31
Payer: COMMERCIAL

## 2018-08-31 VITALS
TEMPERATURE: 97.6 F | RESPIRATION RATE: 14 BRPM | BODY MASS INDEX: 46.86 KG/M2 | SYSTOLIC BLOOD PRESSURE: 121 MMHG | OXYGEN SATURATION: 100 % | HEIGHT: 65 IN | WEIGHT: 281.25 LBS | DIASTOLIC BLOOD PRESSURE: 69 MMHG | HEART RATE: 98 BPM

## 2018-08-31 DIAGNOSIS — A59.9 TRICHOMONIASIS: ICD-10-CM

## 2018-08-31 DIAGNOSIS — R10.13 EPIGASTRIC PAIN: Primary | ICD-10-CM

## 2018-08-31 LAB
A/G RATIO: 1.1 (ref 1.1–2.2)
ALBUMIN SERPL-MCNC: 4 G/DL (ref 3.4–5)
ALP BLD-CCNC: 117 U/L (ref 40–129)
ALT SERPL-CCNC: 13 U/L (ref 10–40)
ANION GAP SERPL CALCULATED.3IONS-SCNC: 20 MMOL/L (ref 3–16)
AST SERPL-CCNC: 16 U/L (ref 15–37)
BACTERIA: ABNORMAL /HPF
BILIRUB SERPL-MCNC: 0.3 MG/DL (ref 0–1)
BILIRUBIN URINE: ABNORMAL
BLOOD, URINE: NEGATIVE
BUN BLDV-MCNC: 11 MG/DL (ref 7–20)
CALCIUM SERPL-MCNC: 9.5 MG/DL (ref 8.3–10.6)
CHLORIDE BLD-SCNC: 93 MMOL/L (ref 99–110)
CLARITY: ABNORMAL
CO2: 19 MMOL/L (ref 21–32)
COLOR: ABNORMAL
CREAT SERPL-MCNC: 0.8 MG/DL (ref 0.6–1.1)
EPITHELIAL CELLS, UA: ABNORMAL /HPF
GFR AFRICAN AMERICAN: >60
GFR NON-AFRICAN AMERICAN: >60
GLOBULIN: 3.8 G/DL
GLUCOSE BLD-MCNC: 263 MG/DL (ref 70–99)
GLUCOSE URINE: 100 MG/DL
HCT VFR BLD CALC: 40.3 % (ref 36–48)
HEMOGLOBIN: 13 G/DL (ref 12–16)
KETONES, URINE: ABNORMAL MG/DL
LEUKOCYTE ESTERASE, URINE: ABNORMAL
LIPASE: 67 U/L (ref 13–60)
MCH RBC QN AUTO: 28.2 PG (ref 26–34)
MCHC RBC AUTO-ENTMCNC: 32.3 G/DL (ref 31–36)
MCV RBC AUTO: 87.5 FL (ref 80–100)
MICROSCOPIC EXAMINATION: YES
NITRITE, URINE: NEGATIVE
PDW BLD-RTO: 13.8 % (ref 12.4–15.4)
PH UA: 6
PLATELET # BLD: 372 K/UL (ref 135–450)
PMV BLD AUTO: 9 FL (ref 5–10.5)
POTASSIUM REFLEX MAGNESIUM: 4.2 MMOL/L (ref 3.5–5.1)
PROTEIN UA: 30 MG/DL
RBC # BLD: 4.61 M/UL (ref 4–5.2)
RBC UA: ABNORMAL /HPF (ref 0–2)
REASON FOR REJECTION: NORMAL
REJECTED TEST: NORMAL
SODIUM BLD-SCNC: 132 MMOL/L (ref 136–145)
SPECIFIC GRAVITY UA: 1.02
TOTAL PROTEIN: 7.8 G/DL (ref 6.4–8.2)
TRICHOMONAS: PRESENT /HPF
URINE TYPE: ABNORMAL
UROBILINOGEN, URINE: 1 E.U./DL
WBC # BLD: 11.6 K/UL (ref 4–11)
WBC UA: ABNORMAL /HPF (ref 0–5)

## 2018-08-31 PROCEDURE — 2580000003 HC RX 258: Performed by: EMERGENCY MEDICINE

## 2018-08-31 PROCEDURE — 6360000004 HC RX CONTRAST MEDICATION: Performed by: EMERGENCY MEDICINE

## 2018-08-31 PROCEDURE — 74177 CT ABD & PELVIS W/CONTRAST: CPT

## 2018-08-31 PROCEDURE — 99284 EMERGENCY DEPT VISIT MOD MDM: CPT

## 2018-08-31 PROCEDURE — 96376 TX/PRO/DX INJ SAME DRUG ADON: CPT

## 2018-08-31 PROCEDURE — 81001 URINALYSIS AUTO W/SCOPE: CPT

## 2018-08-31 PROCEDURE — 36415 COLL VENOUS BLD VENIPUNCTURE: CPT

## 2018-08-31 PROCEDURE — 96374 THER/PROPH/DIAG INJ IV PUSH: CPT

## 2018-08-31 PROCEDURE — 96361 HYDRATE IV INFUSION ADD-ON: CPT

## 2018-08-31 PROCEDURE — 6360000002 HC RX W HCPCS: Performed by: EMERGENCY MEDICINE

## 2018-08-31 PROCEDURE — 83690 ASSAY OF LIPASE: CPT

## 2018-08-31 PROCEDURE — 96375 TX/PRO/DX INJ NEW DRUG ADDON: CPT

## 2018-08-31 PROCEDURE — 80053 COMPREHEN METABOLIC PANEL: CPT

## 2018-08-31 PROCEDURE — 85027 COMPLETE CBC AUTOMATED: CPT

## 2018-08-31 RX ORDER — 0.9 % SODIUM CHLORIDE 0.9 %
1000 INTRAVENOUS SOLUTION INTRAVENOUS ONCE
Status: COMPLETED | OUTPATIENT
Start: 2018-08-31 | End: 2018-08-31

## 2018-08-31 RX ORDER — FAMOTIDINE 20 MG/1
20 TABLET, FILM COATED ORAL 2 TIMES DAILY
Qty: 60 TABLET | Refills: 0 | Status: SHIPPED | OUTPATIENT
Start: 2018-08-31 | End: 2022-04-22 | Stop reason: ALTCHOICE

## 2018-08-31 RX ORDER — ONDANSETRON 2 MG/ML
4 INJECTION INTRAMUSCULAR; INTRAVENOUS ONCE
Status: COMPLETED | OUTPATIENT
Start: 2018-08-31 | End: 2018-08-31

## 2018-08-31 RX ORDER — OXYCODONE AND ACETAMINOPHEN 2.5; 325 MG/1; MG/1
1 TABLET ORAL EVERY 4 HOURS PRN
Qty: 12 TABLET | Refills: 0 | Status: SHIPPED | OUTPATIENT
Start: 2018-08-31 | End: 2018-09-02

## 2018-08-31 RX ORDER — MORPHINE SULFATE 4 MG/ML
4 INJECTION, SOLUTION INTRAMUSCULAR; INTRAVENOUS ONCE
Status: COMPLETED | OUTPATIENT
Start: 2018-08-31 | End: 2018-08-31

## 2018-08-31 RX ORDER — METRONIDAZOLE 250 MG/1
250 TABLET ORAL 3 TIMES DAILY
Qty: 21 TABLET | Refills: 0 | Status: SHIPPED | OUTPATIENT
Start: 2018-08-31 | End: 2018-09-07

## 2018-08-31 RX ADMIN — MORPHINE SULFATE 4 MG: 4 INJECTION INTRAVENOUS at 16:41

## 2018-08-31 RX ADMIN — MORPHINE SULFATE 4 MG: 4 INJECTION INTRAVENOUS at 18:21

## 2018-08-31 RX ADMIN — IOHEXOL 50 ML: 240 INJECTION, SOLUTION INTRATHECAL; INTRAVASCULAR; INTRAVENOUS; ORAL at 17:49

## 2018-08-31 RX ADMIN — SODIUM CHLORIDE 1000 ML: 9 INJECTION, SOLUTION INTRAVENOUS at 16:41

## 2018-08-31 RX ADMIN — IOPAMIDOL 80 ML: 755 INJECTION, SOLUTION INTRAVENOUS at 17:49

## 2018-08-31 RX ADMIN — ONDANSETRON 4 MG: 2 INJECTION INTRAMUSCULAR; INTRAVENOUS at 16:41

## 2018-08-31 ASSESSMENT — PAIN DESCRIPTION - ORIENTATION
ORIENTATION: UPPER
ORIENTATION: UPPER

## 2018-08-31 ASSESSMENT — PAIN SCALES - GENERAL
PAINLEVEL_OUTOF10: 10
PAINLEVEL_OUTOF10: 6
PAINLEVEL_OUTOF10: 6

## 2018-08-31 ASSESSMENT — PAIN DESCRIPTION - LOCATION
LOCATION: ABDOMEN

## 2018-08-31 ASSESSMENT — PAIN DESCRIPTION - DESCRIPTORS
DESCRIPTORS: ACHING

## 2018-08-31 ASSESSMENT — PAIN DESCRIPTION - PAIN TYPE: TYPE: ACUTE PAIN

## 2018-08-31 NOTE — ED PROVIDER NOTES
CHIEF COMPLAINT  Abdominal Pain (upper)      HISTORY OF PRESENT ILLNESS  Magdy Stinson is a 52 y.o. female, who presents to the ED with onset at 11 AM this morning of severe epigastric abdominal pain associated with nausea no vomiting no diaphoresis no shortness of breath no chest pain. No diarrhea no constipation normal bowel movement this morning. Patient states the pain feels similar to the pain of her pancreatitis which occurred after a pancreatic biopsy 13 years ago. Patient denies alcohol use. No change in her medications  ROS    I have reviewed the following from the nursing documentation. Past Medical History:   Diagnosis Date    Asthma     Back pain     Cerebral artery occlusion with cerebral infarction ([address]     Diabetes mellitus ([facility]     Hyperlipidemia     Hypertension     Spinal cord stimulator status      Past Surgical History:   Procedure Laterality Date    ENDOSCOPY, COLON, DIAGNOSTIC      OTHER SURGICAL HISTORY  06/23/2017    EGD with biopsy    PANCREAS SURGERY      IL IMPLANT NEUROSTIM/ N/A 8/21/2018    REPLACEMENT / MOVE SPINAL CORD STIMULATOR BATTERY performed by Sheron Wise MD at [address]      TUBAL LIGATION       Family History   Problem Relation Age of Onset    High Blood Pressure Mother     Cancer Sister     High Blood Pressure Maternal Grandmother     High Blood Pressure Maternal Grandfather      Social History     Social History    Marital status:      Spouse name: N/A    Number of children: N/A    Years of education: N/A     Occupational History    Not on file.      Social History Main Topics    Smoking status: Former Smoker     Packs/day: 0.50     Years: 16.00     Types: Cigarettes    Smokeless tobacco: Former User     Quit date: 4/14/2017    Alcohol use No    Drug use: No    Sexual activity: Not on file     Other Topics Concern    Not on file     Social History Narrative    No narrative on file     No current facility-administered medications for this encounter. Current Outpatient Prescriptions   Medication Sig Dispense Refill    famotidine (PEPCID) 20 MG tablet Take 1 tablet by mouth 2 times daily 60 tablet 0    metroNIDAZOLE (FLAGYL) 250 MG tablet Take 1 tablet by mouth 3 times daily for 7 days 21 tablet 0    amLODIPine (NORVASC) 10 MG tablet Take 10 mg by mouth daily      calcium citrate-vitamin D (CITRICAL + D) 315-250 MG-UNIT TABS per tablet Take 1 tablet by mouth daily (with breakfast) 120 tablet 2    insulin aspart (NOVOLOG FLEXPEN) 100 UNIT/ML injection pen Inject up to 50 units a day as directed by physician      HYDROcodone-acetaminophen (NORCO)  MG per tablet Take 1 tablet by mouth .  HYDROcodone-acetaminophen (NORCO)  MG per tablet 3 times daily . 0    albuterol (PROVENTIL HFA) 108 (90 BASE) MCG/ACT inhaler Inhale 2 puffs into the lungs every 4 hours as needed for Wheezing 1 Inhaler 3    amitriptyline (ELAVIL) 75 MG tablet TAKE 1 TABLET BY MOUTH EVERY NIGHT AT BEDTIME 30 tablet 0    albuterol (PROVENTIL HFA) 108 (90 BASE) MCG/ACT inhaler Inhale 2 puffs into the lungs every 6 hours as needed for Wheezing. 1 Inhaler 3    losartan (COZAAR) 100 MG tablet Take 100 mg by mouth daily.  insulin glargine (LANTUS) 100 UNIT/ML injection Inject 45 Units into the skin nightly.  metFORMIN (GLUCOPHAGE) 1000 MG tablet Take 1,000 mg by mouth 2 times daily (with meals)      atorvastatin (LIPITOR) 40 MG tablet Take 40 mg by mouth daily  0     No Known Allergies  ROS       PHYSICAL EXAM  /69   Pulse 98   Temp 97.6 °F (36.4 °C)   Resp 14   Ht 5' 5\" (1.651 m)   Wt 127.6 kg (281 lb 4 oz)   SpO2 100%   BMI 46.80 kg/m²   GENERAL APPEARANCE: Awake and alert. Cooperative. In no acute distress. EYES: PERRL. Corneas clear. Sclera non icteric. No conjunctival injection  ENT: Oropharynx clear. Airway patent. No stridor. No asymmetry. NECK: Supple  LUNGS: Clear.  Equal breath sounds bilaterally. CARDIOVASCULAR: RRR. No murmurs rubs or gallops. ABDOMEN: Soft Epigastric tenderness No guarding or rebound. EXTREMITIES:  Moves all extremities equally. SKIN: Warm and dry. NEURO: Alert and oriented x3. Strength 5/5 throughout.           LABORATORY STUDIES:   Labs Reviewed   CBC - Abnormal; Notable for the following:        Result Value    WBC 11.6 (*)     All other components within normal limits    Narrative:     Performed at:  [place] [address]   Phone [phone] - Abnormal; Notable for the following:     Color, UA DARK YELLOW (*)     Clarity, UA CLOUDY (*)     Glucose, Ur 100 (*)     Bilirubin Urine SMALL (*)     Ketones, Urine TRACE (*)     Protein, UA 30 (*)     Leukocyte Esterase, Urine MODERATE (*)     All other components within normal limits    Narrative:     Performed at:  [place] [address]   Phone [phone]   MICROSCOPIC URINALYSIS - Abnormal; Notable for the following:     WBC, UA 20-50 (*)     Bacteria, UA 4+ (*)     Trichomonas, UA Present (*)     All other components within normal limits    Narrative:     Performed at:  [place] [address]   Phone [phone]   COMPREHENSIVE METABOLIC PANEL W/ REFLEX TO MG FOR LOW K - Abnormal; Notable for the following:     Sodium 132 (*)     Chloride 93 (*)     CO2 19 (*)     Anion Gap 20 (*)     Glucose 263 (*)     All other components within normal limits    Narrative:     Performed at:  [place] [address]   Phone [phone]   LIPASE - Abnormal; Notable for the following:     Lipase 67.0 (*)     All other components within normal limits    Narrative:     Performed at:  [facility]

## 2020-08-27 ENCOUNTER — HOSPITAL ENCOUNTER (EMERGENCY)
Age: 55
Discharge: HOME | End: 2020-08-27
Attending: EMERGENCY MEDICINE
Payer: MEDICARE

## 2020-08-27 ENCOUNTER — APPOINTMENT (OUTPATIENT)
Dept: CT IMAGING | Age: 55
End: 2020-08-27
Payer: MEDICARE

## 2020-08-27 VITALS
HEART RATE: 88 BPM | HEIGHT: 65 IN | RESPIRATION RATE: 16 BRPM | DIASTOLIC BLOOD PRESSURE: 87 MMHG | TEMPERATURE: 98.5 F | BODY MASS INDEX: 40.22 KG/M2 | SYSTOLIC BLOOD PRESSURE: 140 MMHG | WEIGHT: 241.4 LBS | OXYGEN SATURATION: 100 %

## 2020-08-27 LAB
ACANTHOCYTES: ABNORMAL
ANION GAP SERPL CALCULATED.3IONS-SCNC: 16 MMOL/L (ref 3–16)
BACTERIA: ABNORMAL /HPF
BASOPHILS ABSOLUTE: 0.1 K/UL (ref 0–0.2)
BASOPHILS RELATIVE PERCENT: 1 %
BILIRUBIN URINE: ABNORMAL
BLOOD, URINE: NEGATIVE
BUN BLDV-MCNC: 13 MG/DL (ref 7–20)
BURR CELLS: ABNORMAL
CALCIUM SERPL-MCNC: 9 MG/DL (ref 8.3–10.6)
CHLORIDE BLD-SCNC: 99 MMOL/L (ref 99–110)
CLARITY: ABNORMAL
CO2: 20 MMOL/L (ref 21–32)
COLOR: ABNORMAL
CREAT SERPL-MCNC: 0.8 MG/DL (ref 0.6–1.1)
EOSINOPHILS ABSOLUTE: 0.3 K/UL (ref 0–0.6)
EOSINOPHILS RELATIVE PERCENT: 3 %
EPITHELIAL CELLS, UA: 10 /HPF (ref 0–5)
GFR AFRICAN AMERICAN: >60
GFR NON-AFRICAN AMERICAN: >60
GLUCOSE BLD-MCNC: 193 MG/DL (ref 70–99)
GLUCOSE URINE: NEGATIVE MG/DL
HCT VFR BLD CALC: 39.8 % (ref 36–48)
HEMATOLOGY PATH CONSULT: YES
HEMOGLOBIN: 13.1 G/DL (ref 12–16)
HOWELL-JOLLY BODIES: ABNORMAL
HYALINE CASTS: 8 /LPF (ref 0–8)
KETONES, URINE: ABNORMAL MG/DL
LEUKOCYTE ESTERASE, URINE: ABNORMAL
LYMPHOCYTES ABSOLUTE: 5.9 K/UL (ref 1–5.1)
LYMPHOCYTES RELATIVE PERCENT: 52 %
MCH RBC QN AUTO: 28.3 PG (ref 26–34)
MCHC RBC AUTO-ENTMCNC: 32.8 G/DL (ref 31–36)
MCV RBC AUTO: 86.4 FL (ref 80–100)
MICROSCOPIC EXAMINATION: YES
MONOCYTES ABSOLUTE: 0.5 K/UL (ref 0–1.3)
MONOCYTES RELATIVE PERCENT: 4 %
NEUTROPHILS ABSOLUTE: 4.5 K/UL (ref 1.7–7.7)
NEUTROPHILS RELATIVE PERCENT: 40 %
NITRITE, URINE: NEGATIVE
OVALOCYTES: ABNORMAL
PDW BLD-RTO: 14.3 % (ref 12.4–15.4)
PH UA: 6 (ref 5–8)
PLATELET # BLD: 302 K/UL (ref 135–450)
PLATELET SLIDE REVIEW: ADEQUATE
PMV BLD AUTO: 9.1 FL (ref 5–10.5)
POTASSIUM SERPL-SCNC: 3.8 MMOL/L (ref 3.5–5.1)
PROTEIN UA: ABNORMAL MG/DL
RBC # BLD: 4.61 M/UL (ref 4–5.2)
RBC UA: 3 /HPF (ref 0–4)
SLIDE REVIEW: ABNORMAL
SODIUM BLD-SCNC: 135 MMOL/L (ref 136–145)
SPECIFIC GRAVITY UA: 1.02 (ref 1–1.03)
TARGET CELLS: ABNORMAL
URINE REFLEX TO CULTURE: ABNORMAL
URINE TYPE: ABNORMAL
UROBILINOGEN, URINE: 1 E.U./DL
WBC # BLD: 11.3 K/UL (ref 4–11)
WBC UA: 8 /HPF (ref 0–5)

## 2020-08-27 PROCEDURE — 6360000002 HC RX W HCPCS: Performed by: PHYSICIAN ASSISTANT

## 2020-08-27 PROCEDURE — 6370000000 HC RX 637 (ALT 250 FOR IP): Performed by: PHYSICIAN ASSISTANT

## 2020-08-27 PROCEDURE — 99284 EMERGENCY DEPT VISIT MOD MDM: CPT

## 2020-08-27 PROCEDURE — 96374 THER/PROPH/DIAG INJ IV PUSH: CPT

## 2020-08-27 PROCEDURE — 84132 ASSAY OF SERUM POTASSIUM: CPT

## 2020-08-27 PROCEDURE — 81001 URINALYSIS AUTO W/SCOPE: CPT

## 2020-08-27 PROCEDURE — 36415 COLL VENOUS BLD VENIPUNCTURE: CPT

## 2020-08-27 PROCEDURE — 80048 BASIC METABOLIC PNL TOTAL CA: CPT

## 2020-08-27 PROCEDURE — 74176 CT ABD & PELVIS W/O CONTRAST: CPT

## 2020-08-27 PROCEDURE — 85025 COMPLETE CBC W/AUTO DIFF WBC: CPT

## 2020-08-27 RX ORDER — ASPIRIN 81 MG/1
81 TABLET, CHEWABLE ORAL DAILY
COMMUNITY

## 2020-08-27 RX ORDER — METHOCARBAMOL 500 MG/1
500 TABLET, FILM COATED ORAL 4 TIMES DAILY
Qty: 40 TABLET | Refills: 0 | Status: SHIPPED | OUTPATIENT
Start: 2020-08-27 | End: 2020-09-06

## 2020-08-27 RX ORDER — IBUPROFEN 600 MG/1
600 TABLET ORAL EVERY 8 HOURS PRN
Qty: 30 TABLET | Refills: 0 | Status: SHIPPED | OUTPATIENT
Start: 2020-08-27 | End: 2022-04-22 | Stop reason: ALTCHOICE

## 2020-08-27 RX ORDER — LIDOCAINE 50 MG/G
1 PATCH TOPICAL DAILY
Qty: 15 PATCH | Refills: 0 | Status: SHIPPED | OUTPATIENT
Start: 2020-08-27 | End: 2022-04-22 | Stop reason: ALTCHOICE

## 2020-08-27 RX ORDER — DEXAMETHASONE SODIUM PHOSPHATE 4 MG/ML
10 INJECTION, SOLUTION INTRA-ARTICULAR; INTRALESIONAL; INTRAMUSCULAR; INTRAVENOUS; SOFT TISSUE ONCE
Status: COMPLETED | OUTPATIENT
Start: 2020-08-27 | End: 2020-08-27

## 2020-08-27 RX ORDER — HYDROCODONE BITARTRATE AND ACETAMINOPHEN 7.5; 325 MG/1; MG/1
1 TABLET ORAL EVERY 6 HOURS PRN
COMMUNITY
End: 2022-04-22 | Stop reason: ALTCHOICE

## 2020-08-27 RX ORDER — OXYCODONE HYDROCHLORIDE AND ACETAMINOPHEN 5; 325 MG/1; MG/1
1 TABLET ORAL ONCE
Status: COMPLETED | OUTPATIENT
Start: 2020-08-27 | End: 2020-08-27

## 2020-08-27 RX ADMIN — OXYCODONE HYDROCHLORIDE AND ACETAMINOPHEN 1 TABLET: 5; 325 TABLET ORAL at 18:46

## 2020-08-27 RX ADMIN — DEXAMETHASONE SODIUM PHOSPHATE 10 MG: 4 INJECTION, SOLUTION INTRAMUSCULAR; INTRAVENOUS at 19:56

## 2020-08-27 ASSESSMENT — ENCOUNTER SYMPTOMS
DIARRHEA: 0
COLOR CHANGE: 0
VOMITING: 0
CONSTIPATION: 0
ABDOMINAL PAIN: 0
COUGH: 0
NAUSEA: 0
BACK PAIN: 1
SHORTNESS OF BREATH: 0

## 2020-08-27 ASSESSMENT — PAIN DESCRIPTION - LOCATION
LOCATION: BACK
LOCATION: BACK

## 2020-08-27 ASSESSMENT — PAIN DESCRIPTION - ORIENTATION
ORIENTATION: RIGHT;LOWER
ORIENTATION: RIGHT;LOWER

## 2020-08-27 ASSESSMENT — PAIN SCALES - GENERAL
PAINLEVEL_OUTOF10: 6
PAINLEVEL_OUTOF10: 10
PAINLEVEL_OUTOF10: 10

## 2020-08-27 ASSESSMENT — PAIN DESCRIPTION - PROGRESSION
CLINICAL_PROGRESSION: NOT CHANGED
CLINICAL_PROGRESSION: GRADUALLY IMPROVING

## 2020-08-27 ASSESSMENT — PAIN DESCRIPTION - FREQUENCY
FREQUENCY: CONTINUOUS
FREQUENCY: CONTINUOUS

## 2020-08-27 ASSESSMENT — PAIN DESCRIPTION - DESCRIPTORS
DESCRIPTORS: PRESSURE
DESCRIPTORS: OTHER (COMMENT);PRESSURE

## 2020-08-27 ASSESSMENT — PAIN DESCRIPTION - ONSET: ONSET: ON-GOING

## 2020-08-27 ASSESSMENT — PAIN - FUNCTIONAL ASSESSMENT: PAIN_FUNCTIONAL_ASSESSMENT: PREVENTS OR INTERFERES SOME ACTIVE ACTIVITIES AND ADLS

## 2020-08-27 ASSESSMENT — PAIN DESCRIPTION - PAIN TYPE
TYPE: ACUTE PAIN
TYPE: ACUTE PAIN

## 2020-08-27 NOTE — ED PROVIDER NOTES
Positive for back pain (right lower back) and myalgias. Negative for joint swelling. Skin: Negative for color change and rash. Neurological: Negative for dizziness, light-headedness and numbness. Positives and Pertinent negatives as per HPI. Except as noted above in the ROS, all other systems were reviewed and negative. PAST MEDICAL HISTORY     Past Medical History:   Diagnosis Date    Asthma     Back pain     Cerebral artery occlusion with cerebral infarction ([address]     Diabetes mellitus ([address]     Hyperlipidemia     Hypertension     Spinal cord stimulator status          SURGICAL HISTORY     Past Surgical History:   Procedure Laterality Date    ENDOSCOPY, COLON, DIAGNOSTIC      OTHER SURGICAL HISTORY  06/23/2017    EGD with biopsy    PANCREAS SURGERY      NC IMPLANT NEUROSTIM/ N/A 8/21/2018    REPLACEMENT / MOVE SPINAL CORD STIMULATOR BATTERY performed by Tere Leblanc MD at [address]       Previous Medications    ALBUTEROL (PROVENTIL HFA) 108 (90 BASE) MCG/ACT INHALER    Inhale 2 puffs into the lungs every 6 hours as needed for Wheezing. ALBUTEROL (PROVENTIL HFA) 108 (90 BASE) MCG/ACT INHALER    Inhale 2 puffs into the lungs every 4 hours as needed for Wheezing    AMITRIPTYLINE (ELAVIL) 75 MG TABLET    TAKE 1 TABLET BY MOUTH EVERY NIGHT AT BEDTIME    AMLODIPINE (NORVASC) 10 MG TABLET    Take 10 mg by mouth daily    ASPIRIN 81 MG CHEWABLE TABLET    Take 81 mg by mouth daily    ATORVASTATIN (LIPITOR) 40 MG TABLET    Take 40 mg by mouth daily    CALCIUM CITRATE-VITAMIN D (CITRICAL + D) 315-250 MG-UNIT TABS PER TABLET    Take 1 tablet by mouth daily (with breakfast)    FAMOTIDINE (PEPCID) 20 MG TABLET    Take 1 tablet by mouth 2 times daily    HYDROCODONE-ACETAMINOPHEN (NORCO)  MG PER TABLET    3 times daily . HYDROCODONE-ACETAMINOPHEN (NORCO)  MG PER TABLET    Take 1 tablet by mouth . HYDROCODONE-ACETAMINOPHEN (NORCO) 7.5-325 MG PER TABLET    Take 1 tablet by mouth every 6 hours as needed for Pain. INSULIN ASPART (NOVOLOG FLEXPEN) 100 UNIT/ML INJECTION PEN    Inject up to 50 units a day as directed by physician    INSULIN GLARGINE (LANTUS) 100 UNIT/ML INJECTION    Inject 45 Units into the skin nightly. LOSARTAN (COZAAR) 100 MG TABLET    Take 100 mg by mouth daily. METFORMIN (GLUCOPHAGE) 1000 MG TABLET    Take 1,000 mg by mouth 2 times daily (with meals)         ALLERGIES     Patient has no known allergies. FAMILYHISTORY       Family History   Problem Relation Age of Onset    High Blood Pressure Mother     Cancer Sister     High Blood Pressure Maternal Grandmother     High Blood Pressure Maternal Grandfather           SOCIAL HISTORY       Social History     Tobacco Use    Smoking status: Current Every Day Smoker     Packs/day: 0.50     Years: 16.00     Pack years: 8.00     Types: Cigarettes    Smokeless tobacco: Former User     Quit date: 4/14/2017   Substance Use Topics    Alcohol use: No    Drug use: Yes     Types: Opiates      Comment: prescribed norco        SCREENINGS             PHYSICAL EXAM    (up to 7 for level 4, 8 or more for level 5)     ED Triage Vitals [08/27/20 1727]   BP Temp Temp Source Pulse Resp SpO2 Height Weight   (!) 179/101 98.5 °F (36.9 °C) Oral 94 15 100 % 5' 5\" (1.651 m) 241 lb 6.5 oz (109.5 kg)       Physical Exam  Vitals signs and nursing note reviewed. Constitutional:       General: She is not in acute distress. Appearance: She is well-developed. She is not ill-appearing, toxic-appearing or diaphoretic. HENT:      Head: Normocephalic and atraumatic. Eyes:      Conjunctiva/sclera: Conjunctivae normal.      Pupils: Pupils are equal, round, and reactive to light. Cardiovascular:      Rate and Rhythm: Normal rate and regular rhythm. Pulmonary:      Effort: Pulmonary effort is normal. No respiratory distress.       Breath sounds: Normal breath sounds. Abdominal:      General: Bowel sounds are normal. There is no distension. Palpations: Abdomen is soft. Tenderness: There is no abdominal tenderness. Musculoskeletal:      Right hip: Normal.      Left hip: Normal.      Lumbar back: She exhibits tenderness and pain. She exhibits no bony tenderness. Back:    Skin:     General: Skin is warm and dry. Neurological:      General: No focal deficit present. Mental Status: She is alert and oriented to person, place, and time. Psychiatric:         Behavior: Behavior normal. Behavior is cooperative. Thought Content:  Thought content normal.         DIAGNOSTIC RESULTS   LABS:    Labs Reviewed   URINE RT REFLEX TO CULTURE - Abnormal; Notable for the following components:       Result Value    Clarity, UA CLOUDY (*)     Bilirubin Urine SMALL (*)     Ketones, Urine TRACE (*)     Protein, UA TRACE (*)     Leukocyte Esterase, Urine TRACE (*)     All other components within normal limits    Narrative:     Performed at:  [facility]  [address] [email or web address] [employer]   Phone [phone]   CBC WITH AUTO DIFFERENTIAL - Abnormal; Notable for the following components:    WBC 11.3 (*)     Lymphocytes Absolute 5.9 (*)     Acanthocytes Occasional (*)     [place] Cells Occasional (*)     Ovalocytes Occasional (*)     Target Cells Occasional (*)     [place] Bodies Occasional (*)     All other components within normal limits    Narrative:     Performed at:  [facility]  [address] [facility] [address]   Phone [phone]   BASIC METABOLIC PANEL W/ REFLEX TO MG FOR LOW K - Abnormal; Notable for the following components:    Sodium 135 (*)     CO2 20 (*)     Glucose 193 (*)     All other components within normal limits    Narrative:     Performed at:  [facility]  [address] [facility] [place] [address]   Phone [phone] mg Intravenous Given 8/27/20 1956)           ED COURSE & MEDICAL DECISION MAKING    Pertinent Labs & Imaging studies reviewed. (See chart for details)   -  Patient seen and evaluated in the emergency department. -  Triage and nursing notes reviewed and incorporated. -  Old chart records reviewed and incorporated. -   I have seen and evaluated this patient with my supervising physician Nadia Escalona MD.  -  Differential diagnosis includes: abdominal aortic aneurysm, cauda equina syndrome, epidural mass lesion, spinal stenosis, herniated disk causing severe stenosis, sprain/strain, fracture, contusion, sciatica, UTI, pyelonephritis, kidney stone  -  Work-up included:  See above  -  ED treatment included:  percocet, decadron  -  Results discussed with patient. Labs show no leukocytosis or concerning anemia. There are no concerning electrolyte abnormalities. Her urine appears contaminated - will be sent for culture. Imaging studies show no acute abnormalities of the abdomen or pelvis on CT. Patient feels improved. At this time, we recommend discharge. She is advised to monitor her sugars for the next several days and adjust her insulin accordingly as the decadron can cause her glucose to elevate. She was also advised to use caution - she was given robaxin a few days ago and was advised to take either one prescription or the other - not both. The patient is agreeable with plan of care and disposition.  -  Disposition:  discharge    FINAL IMPRESSION      1.  Right-sided low back pain with right-sided sciatica, unspecified chronicity          DISPOSITION/PLAN   DISPOSITION Decision To Discharge 08/27/2020 08:12:04 PM      PATIENT REFERREDTO:  Thania Lovell [facility]    Schedule an appointment as soon as possible for a visit       [facility] ([place], [place]) Emergency Department  [address] [facility]  [phone]    If symptoms worsen      DISCHARGE MEDICATIONS:  New Prescriptions IBUPROFEN (ADVIL;MOTRIN) 600 MG TABLET    Take 1 tablet by mouth every 8 hours as needed for Pain    LIDOCAINE (LIDODERM) 5 %    Place 1 patch onto the skin daily 12 hours on, 12 hours off. *If these are not covered, may substitute for 4% patches or OTC*    METHOCARBAMOL (ROBAXIN) 500 MG TABLET    Take 1 tablet by mouth 4 times daily for 10 days       DISCONTINUED MEDICATIONS:  Discontinued Medications    No medications on file              (Please note that portions of this note were completed with a voice recognition program.  Efforts were made to edit the dictations but occasionally words are mis-transcribed.)    ANGELINE Kimbrough (electronically signed)            Ashly Kimbrough  08/27/20 5175

## 2020-08-27 NOTE — ED PROVIDER NOTES
ED Attending Attestation Note     Date of evaluation: 8/27/2020    This patient was seen by the advance practice provider. I have seen and examined the patient, agree with the workup, evaluation, management and diagnosis. The care plan has been discussed. 58yo female with history of one week lower right back pain with radiation to her buttock and lateral leg. Takes norco chronically for pain and has doubled her dose without any improvement in her pain. Of note was seen at [facility] for similar symptoms a few days ago. On my evaluation the patient is resting comfortably in bed. Her exam is consistent with sciatica. Her blood pressure had improved without intervention. We discussed how her symptoms are most likely due to sciatica and she stated she came here for further evaluation because she felt [facility] had not fully evaluated her. I asked her about her urinary urgency/frequency and she stated she had been drinking a lot more water and peeing more but otherwise denied any symptoms and as her urinalysis is negative for signs of overt infection we will not treat her for UTI. We discussed the importance of following up with primary care for physical therapy and will discharge her with information for back stretching/sciatica exercises. She also received 1 dose of Decadron here as given her history of diabetes we do not want to give her further steroids (she did receive them from the outside hospital a few days ago).     INITIAL VITALS: BP: (!) 179/101, Temp: 98.5 °F (36.9 °C), Pulse: 94, Resp: 15, SpO2: 100 %   RECENT VITALS:  BP: 138/71,Temp: 98.5 °F (36.9 °C), Pulse: 88, Resp: 16, SpO2: 100 %     Patient was given the following medications:  Orders Placed This Encounter   Medications    oxyCODONE-acetaminophen (PERCOCET) 5-325 MG per tablet 1 tablet    Dexamethasone Sodium Phosphate injection 10 mg    methocarbamol (ROBAXIN) 500 MG tablet     Sig: Take 1 tablet by mouth 4 times daily for 10

## 2020-08-27 NOTE — ED TRIAGE NOTES
Patient was seen at [facility] on Friday due to blood pressure. Patient is here today for right flank/back pain. Patient seen at clinic and told to take 2 of her blood pressure pills. Patient reports hx of a TIA and \"minor strokes. \" patient also with herniated disc.

## 2020-08-28 LAB — HEMATOLOGY PATH CONSULT: NORMAL

## 2020-09-24 ENCOUNTER — TELEPHONE (OUTPATIENT)
Dept: ORTHOPEDIC SURGERY | Age: 55
End: 2020-09-24

## 2020-09-24 NOTE — TELEPHONE ENCOUNTER
RECEIVED REFERRAL FROM [address] [place] [address]. CALLED PATIENT.  L/M ON VM INSTRUCTING PATIENT TO CALL BACK TO SCHEDULE NP APPT WITH [facility] OR EITHER PA. REFERRAL SCANNED TO MEDIA

## 2021-02-16 ENCOUNTER — HOSPITAL ENCOUNTER (EMERGENCY)
Age: 56
Discharge: HOME | End: 2021-02-16
Payer: MEDICARE

## 2021-02-16 VITALS
OXYGEN SATURATION: 98 % | HEART RATE: 80 BPM | HEIGHT: 64 IN | BODY MASS INDEX: 36.54 KG/M2 | RESPIRATION RATE: 22 BRPM | WEIGHT: 214 LBS | DIASTOLIC BLOOD PRESSURE: 84 MMHG | TEMPERATURE: 98.1 F | SYSTOLIC BLOOD PRESSURE: 124 MMHG

## 2021-02-16 DIAGNOSIS — S80.11XA CONTUSION OF RIGHT LOWER LEG, INITIAL ENCOUNTER: Primary | ICD-10-CM

## 2021-02-16 LAB
ANION GAP SERPL CALCULATED.3IONS-SCNC: 12 MMOL/L (ref 3–16)
BASOPHILS ABSOLUTE: 0.2 K/UL (ref 0–0.2)
BASOPHILS RELATIVE PERCENT: 2 %
BUN BLDV-MCNC: 9 MG/DL (ref 7–20)
CALCIUM SERPL-MCNC: 9.4 MG/DL (ref 8.3–10.6)
CHLORIDE BLD-SCNC: 101 MMOL/L (ref 99–110)
CO2: 22 MMOL/L (ref 21–32)
CREAT SERPL-MCNC: 0.8 MG/DL (ref 0.6–1.1)
EOSINOPHILS ABSOLUTE: 0.4 K/UL (ref 0–0.6)
EOSINOPHILS RELATIVE PERCENT: 4.3 %
GFR AFRICAN AMERICAN: >60
GFR NON-AFRICAN AMERICAN: >60
GLUCOSE BLD-MCNC: 136 MG/DL (ref 70–99)
HCT VFR BLD CALC: 36.2 % (ref 36–48)
HEMOGLOBIN: 11.7 G/DL (ref 12–16)
LYMPHOCYTES ABSOLUTE: 3.3 K/UL (ref 1–5.1)
LYMPHOCYTES RELATIVE PERCENT: 37 %
MCH RBC QN AUTO: 28.3 PG (ref 26–34)
MCHC RBC AUTO-ENTMCNC: 32.4 G/DL (ref 31–36)
MCV RBC AUTO: 87.4 FL (ref 80–100)
MONOCYTES ABSOLUTE: 0.6 K/UL (ref 0–1.3)
MONOCYTES RELATIVE PERCENT: 6.4 %
NEUTROPHILS ABSOLUTE: 4.5 K/UL (ref 1.7–7.7)
NEUTROPHILS RELATIVE PERCENT: 50.3 %
PDW BLD-RTO: 14.3 % (ref 12.4–15.4)
PLATELET # BLD: 386 K/UL (ref 135–450)
PMV BLD AUTO: 8.7 FL (ref 5–10.5)
POTASSIUM SERPL-SCNC: 4.3 MMOL/L (ref 3.5–5.1)
RBC # BLD: 4.14 M/UL (ref 4–5.2)
SODIUM BLD-SCNC: 135 MMOL/L (ref 136–145)
WBC # BLD: 9 K/UL (ref 4–11)

## 2021-02-16 PROCEDURE — 93971 EXTREMITY STUDY: CPT

## 2021-02-16 PROCEDURE — 85025 COMPLETE CBC W/AUTO DIFF WBC: CPT

## 2021-02-16 PROCEDURE — 36415 COLL VENOUS BLD VENIPUNCTURE: CPT

## 2021-02-16 PROCEDURE — 80048 BASIC METABOLIC PNL TOTAL CA: CPT

## 2021-02-16 PROCEDURE — 99283 EMERGENCY DEPT VISIT LOW MDM: CPT

## 2021-02-16 ASSESSMENT — ENCOUNTER SYMPTOMS
SHORTNESS OF BREATH: 0
ABDOMINAL PAIN: 0
CHOKING: 0
BACK PAIN: 0
FACIAL SWELLING: 0
SORE THROAT: 0
APNEA: 0
EYE DISCHARGE: 0
EYE REDNESS: 0
NAUSEA: 0
VOMITING: 0

## 2021-02-16 ASSESSMENT — PAIN DESCRIPTION - ORIENTATION: ORIENTATION: RIGHT

## 2021-02-16 ASSESSMENT — PAIN SCALES - GENERAL: PAINLEVEL_OUTOF10: 8

## 2021-02-16 ASSESSMENT — PAIN DESCRIPTION - DESCRIPTORS: DESCRIPTORS: ACHING

## 2021-02-16 ASSESSMENT — PAIN DESCRIPTION - LOCATION: LOCATION: LEG

## 2021-02-16 NOTE — ED TRIAGE NOTES
Right leg pain and bruising-NKI, states bruising on the back of the knee. Pain started 1 week ago, bruising started last night.

## 2021-02-16 NOTE — ED PROVIDER NOTES
**ADVANCED PRACTICE PROVIDER, I HAVE EVALUATED THIS PATIENT**        [address]      Pt Name: Gage Erazo  ZUA:[id number]  [place] 1965  Date of evaluation: 2/16/2021  Provider: Sedrick Cervantes PA-C      Chief Complaint:    Chief Complaint   Patient presents with    Leg Pain     R leg, NKI, states bruising on the back of the knee       Nursing Notes, Past Medical Hx, Past Surgical Hx, Social Hx, Allergies, and Family Hx were all reviewed and agreed with or any disagreements were addressed in the HPI.    HPI:  (Location, Duration, Timing, Severity, Quality, Assoc Sx, Context, Modifying factors)  This is a  60 y.o. female complain of bruising to her right lower thigh just above her right knee. She denies injury. She is not on blood thinners but she does take a baby aspirin a day. Denies shortness of breath, no chest pain, she is a diabetic. She has no history of neuropathy. Denies knee pain, no hip pain. Just noticed bruising yesterday. No other complaints. She is concerned for blood clot. Denies extremity weakness. PastMedical/Surgical History:      Diagnosis Date    Asthma     Back pain     Cerebral artery occlusion with cerebral infarction [address]     Diabetes mellitus ([facility]     Hyperlipidemia     Hypertension     Spinal cord stimulator status          Procedure Laterality Date    ENDOSCOPY, COLON, DIAGNOSTIC      OTHER SURGICAL HISTORY  06/23/2017    EGD with biopsy    PANCREAS SURGERY      IN IMPLANT NEUROSTIM/ N/A 8/21/2018    REPLACEMENT / MOVE SPINAL CORD STIMULATOR BATTERY performed by Youlanda Snellen, MD at [place] [address]         Medications:  Previous Medications    ALBUTEROL (PROVENTIL HFA) 108 (90 BASE) MCG/ACT INHALER    Inhale 2 puffs into the lungs every 6 hours as needed for Wheezing.     ALBUTEROL (PROVENTIL HFA) 108 (90 BASE) MCG/ACT INHALER Inhale 2 puffs into the lungs every 4 hours as needed for Wheezing    AMITRIPTYLINE (ELAVIL) 75 MG TABLET    TAKE 1 TABLET BY MOUTH EVERY NIGHT AT BEDTIME    AMLODIPINE (NORVASC) 10 MG TABLET    Take 10 mg by mouth daily    ASPIRIN 81 MG CHEWABLE TABLET    Take 81 mg by mouth daily    ATORVASTATIN (LIPITOR) 40 MG TABLET    Take 40 mg by mouth daily    CALCIUM CITRATE-VITAMIN D (CITRICAL + D) 315-250 MG-UNIT TABS PER TABLET    Take 1 tablet by mouth daily (with breakfast)    FAMOTIDINE (PEPCID) 20 MG TABLET    Take 1 tablet by mouth 2 times daily    HYDROCODONE-ACETAMINOPHEN (NORCO)  MG PER TABLET    3 times daily . HYDROCODONE-ACETAMINOPHEN (NORCO)  MG PER TABLET    Take 1 tablet by mouth . HYDROCODONE-ACETAMINOPHEN (NORCO) 7.5-325 MG PER TABLET    Take 1 tablet by mouth every 6 hours as needed for Pain. IBUPROFEN (ADVIL;MOTRIN) 600 MG TABLET    Take 1 tablet by mouth every 8 hours as needed for Pain    INSULIN ASPART (NOVOLOG FLEXPEN) 100 UNIT/ML INJECTION PEN    Inject up to 50 units a day as directed by physician    INSULIN GLARGINE (LANTUS) 100 UNIT/ML INJECTION    Inject 45 Units into the skin nightly. LIDOCAINE (LIDODERM) 5 %    Place 1 patch onto the skin daily 12 hours on, 12 hours off. *If these are not covered, may substitute for 4% patches or OTC*    LOSARTAN (COZAAR) 100 MG TABLET    Take 100 mg by mouth daily. METFORMIN (GLUCOPHAGE) 1000 MG TABLET    Take 1,000 mg by mouth 2 times daily (with meals)         Review of Systems:  Review of Systems   Constitutional: Negative for chills and fever. HENT: Negative for congestion, facial swelling and sore throat. Eyes: Negative for discharge and redness. Respiratory: Negative for apnea, choking and shortness of breath. Cardiovascular: Negative for chest pain. Gastrointestinal: Negative for abdominal pain, nausea and vomiting. Genitourinary: Negative for dysuria.    Musculoskeletal: Negative for back pain, neck pain and neck stiffness. Neurological: Negative for dizziness, tremors, seizures, weakness and headaches. Hematological: Bruises/bleeds easily. All other systems reviewed and are negative. Positives and Pertinent negatives as per HPI. Except as noted above in the ROS, problem specific ROS was completed and is negative. Physical Exam:  Physical Exam  Vitals signs and nursing note reviewed. Constitutional:       Appearance: She is well-developed. She is not diaphoretic. HENT:      Head: Normocephalic and atraumatic. Nose: Nose normal.      Mouth/Throat:      Mouth: Mucous membranes are moist.      Pharynx: Oropharynx is clear. Eyes:      General:         Right eye: No discharge. Left eye: No discharge. Neck:      Musculoskeletal: Normal range of motion and neck supple. Cardiovascular:      Rate and Rhythm: Normal rate and regular rhythm. Heart sounds: Normal heart sounds. No murmur. No friction rub. No gallop. Pulmonary:      Effort: Pulmonary effort is normal. No respiratory distress. Breath sounds: Normal breath sounds. No wheezing or rales. Chest:      Chest wall: No tenderness. Abdominal:      General: Abdomen is flat. Bowel sounds are normal. There is no distension. Palpations: Abdomen is soft. There is no mass. Tenderness: There is no abdominal tenderness. There is no guarding or rebound. Musculoskeletal: Normal range of motion. Right hip: She exhibits normal range of motion, normal strength and no tenderness. Right knee: She exhibits normal range of motion and no swelling. No tenderness found. Legs:    Skin:     General: Skin is warm and dry. Neurological:      Mental Status: She is alert and oriented to person, place, and time.    Psychiatric:         Behavior: Behavior normal.         MEDICAL DECISION MAKING    Vitals:    Vitals:    02/16/21 1221 02/16/21 1300   BP: (!) 154/110 124/84   Pulse: 101 73   Resp: 18 11   Temp: 98.1 °F (36.7 °C)    TempSrc: Oral    SpO2: 100% 98%   Weight: 214 lb (97.1 kg)    Height: 5' 4\" (1.626 m)        LABS:  Labs Reviewed   CBC WITH AUTO DIFFERENTIAL - Abnormal; Notable for the following components:       Result Value    Hemoglobin 11.7 (*)     All other components within normal limits    Narrative:     Performed at:  [facility]  [address] [place], [place] [address]   Phone [phone]   BASIC METABOLIC PANEL - Abnormal; Notable for the following components:    Sodium 135 (*)     Glucose 136 (*)     All other components within normal limits    Narrative:     Performed at:  [facility]  [address] [facility] [address]   Phone ([phone] of labs reviewed and werenegative at this time or not returned at the time of this note. RADIOLOGY:   Non-plain film images such as CT, Ultrasound and MRI are read by the radiologist. Denis Beltran PA-C have directly visualized the radiologic plain film image(s) with the below findings:        Interpretation per the Radiologist below, if available at the time of this note:    VL Extremity Venous Right              No results found. MEDICAL DECISION MAKING / ED COURSE:      PROCEDURES:   Procedures    None    Patient was given:  Medications - No data to display    Emergency room course: Patient on exam throat is clear nonerythematous no exudate. Cardiovascular regular rhythm, lungs are clear. No wheeze rales or rhonchi. The right lower extremity hip was nontender knee nontender calf show no tenderness palpation pedal pulse good at 2+. Capillary refill less than 2 seconds all digit. She has some multiple bruises noted on the lower medial to the lateral thigh. There is no tenderness around the area with palpation there is no swelling noted no redness. She is ambulatory. Does not appear to be in acute distress.   Alert oriented x4      Lab result from today shows:  CBC with a white count of 9.0, RBC 4.14, hemoglobin 11.7 hematocrit 36.2. Platelet is 279. BMP shows sodium 135, potassium 4.3, chloride 101 BUN of 9 creatinine 0.8. Venous Doppler of the right lower extremity shows no evidence of deep vein or superficial vein thrombosis involving the right lower extremity and the left common femoral vein. Discussed patient lab results and Doppler results of the right lower extremity with her. As she is okay with the findings of no DVT. She be discharged continue her home medication as prescribed. Follow-up with her primary care physician return for any worsening. She understood discharge plan she will be discharged stable condition. The patient tolerated their visit well. I evaluated the patient. The physician was available for consultation as needed. The patient and / or the family were informed of the results of any tests, a time was given to answer questions, a plan was proposed and they agreed with plan. CLINICAL IMPRESSION:  1.  Contusion of right lower leg, initial encounter        DISPOSITION Decision To Discharge 02/16/2021 02:03:05 PM      PATIENT REFERRED TO:  [facility]    Call   As needed, If symptoms worsen      DISCHARGE MEDICATIONS:  New Prescriptions    No medications on file       DISCONTINUED MEDICATIONS:  Discontinued Medications    No medications on file              (Please note the MDM and HPI sections of this note were completed with a voice recognition program.  Efforts were made to edit the dictations but occasionally words are mis-transcribed.)    Electronically signed, Emma Adams PA-C,          Emma Adams PA-C  02/16/21 2320

## 2022-04-12 ENCOUNTER — HOSPITAL ENCOUNTER (OUTPATIENT)
Dept: CT IMAGING | Age: 57
Discharge: HOME | End: 2022-04-12
Payer: MEDICARE

## 2022-04-12 ENCOUNTER — HOSPITAL ENCOUNTER (OUTPATIENT)
Age: 57
Setting detail: SPECIMEN
Discharge: HOME | End: 2022-04-12
Payer: MEDICARE

## 2022-04-12 DIAGNOSIS — R63.4 WEIGHT LOSS: ICD-10-CM

## 2022-04-12 LAB
A/G RATIO: 1.4 (ref 1.1–2.2)
ALBUMIN SERPL-MCNC: 4.2 G/DL (ref 3.4–5)
ALP BLD-CCNC: 126 U/L (ref 40–129)
ALT SERPL-CCNC: 25 U/L (ref 10–40)
ANION GAP SERPL CALCULATED.3IONS-SCNC: 12 MMOL/L (ref 3–16)
AST SERPL-CCNC: 31 U/L (ref 15–37)
BILIRUB SERPL-MCNC: <0.2 MG/DL (ref 0–1)
BUN BLDV-MCNC: 15 MG/DL (ref 7–20)
CALCIUM SERPL-MCNC: 9.8 MG/DL (ref 8.3–10.6)
CHLORIDE BLD-SCNC: 103 MMOL/L (ref 99–110)
CO2: 21 MMOL/L (ref 21–32)
CREAT SERPL-MCNC: 1 MG/DL (ref 0.6–1.1)
GFR AFRICAN AMERICAN: >60
GFR NON-AFRICAN AMERICAN: 57
GLUCOSE BLD-MCNC: 148 MG/DL (ref 70–99)
IRON: 66 UG/DL (ref 37–145)
LIPASE: 52 U/L (ref 13–60)
POTASSIUM SERPL-SCNC: 5.7 MMOL/L (ref 3.5–5.1)
PREALBUMIN: 30 MG/DL (ref 20–40)
SODIUM BLD-SCNC: 136 MMOL/L (ref 136–145)
TOTAL PROTEIN: 7.3 G/DL (ref 6.4–8.2)

## 2022-04-12 PROCEDURE — 6360000004 HC RX CONTRAST MEDICATION: Performed by: INTERNAL MEDICINE

## 2022-04-12 PROCEDURE — 83690 ASSAY OF LIPASE: CPT

## 2022-04-12 PROCEDURE — 74170 CT ABD WO CNTRST FLWD CNTRST: CPT

## 2022-04-12 PROCEDURE — 80053 COMPREHEN METABOLIC PANEL: CPT

## 2022-04-12 PROCEDURE — 83540 ASSAY OF IRON: CPT

## 2022-04-12 PROCEDURE — 36415 COLL VENOUS BLD VENIPUNCTURE: CPT

## 2022-04-12 PROCEDURE — 84134 ASSAY OF PREALBUMIN: CPT

## 2022-04-12 RX ADMIN — IOHEXOL 50 ML: 240 INJECTION, SOLUTION INTRATHECAL; INTRAVASCULAR; INTRAVENOUS; ORAL at 11:27

## 2022-04-12 RX ADMIN — IOPAMIDOL 75 ML: 755 INJECTION, SOLUTION INTRAVENOUS at 11:28

## 2022-04-22 RX ORDER — IPRATROPIUM BROMIDE AND ALBUTEROL SULFATE 2.5; .5 MG/3ML; MG/3ML
1 SOLUTION RESPIRATORY (INHALATION) EVERY 6 HOURS PRN
COMMUNITY

## 2022-04-22 RX ORDER — AMITRIPTYLINE HYDROCHLORIDE 25 MG/1
25 TABLET, FILM COATED ORAL 2 TIMES DAILY
COMMUNITY

## 2022-04-22 RX ORDER — TRAMADOL HYDROCHLORIDE 50 MG/1
50 TABLET ORAL EVERY 8 HOURS PRN
COMMUNITY

## 2022-04-22 RX ORDER — METOPROLOL TARTRATE 50 MG/1
50 TABLET, FILM COATED ORAL 2 TIMES DAILY
COMMUNITY

## 2022-04-22 NOTE — PROGRESS NOTES
Patient reached _X___ yes  _____ no   VM instructions left ____ yes   phone number ________                                ____ no-office notified          Date _4/29/22________  Time __1100_____  Arrival _0930  hosp-endo_____    Nothing to eat or drink after midnight-follow your doctors prep instructions-this may include taking a second dose of your prep after midnight  Responsible adult 25 or older to stay on site while you are here-drive you home-stay with you after  Follow any instructions your doctors office has given you  Bring a complete list of all your medications and supplements including name,dose,how often taken the day of your procedure  If you normally take the following medications in the morning please do so the AM of your procedure with a small sip of water       Heart,blood pressure,seizure,thyroid or breathing medications-use your inhalers       DO NOT take blood pressure medications ending in \"dorie\" or \"pril\" the AM of procedure or evening prior-DO NOT TAKE LOSARTAN  Take half or your normal dose of any long acting insulins the night before your procedure-do not take any diabetic medications the AM of procedure  Follow your doctors instructions regarding stopping or taking  any blood thinners-if you do not have instructions-call them  Any questions call your doctor  Other ______take amlodipine, metoprolol am of procedure________________________________________________________      VISITOR POLICY(subject to change)             The current policy is 2 visitors per patient. There are no children allowed. Everyone must mask. Visiting hours are 8a-8p. Overnight visitors will be at the discretion of the nurse.

## 2022-04-29 ENCOUNTER — ANESTHESIA (OUTPATIENT)
Dept: ENDOSCOPY | Age: 57
End: 2022-04-29
Payer: MEDICARE

## 2022-04-29 ENCOUNTER — HOSPITAL ENCOUNTER (OUTPATIENT)
Age: 57
Setting detail: OUTPATIENT SURGERY
Discharge: HOME | End: 2022-04-29
Attending: INTERNAL MEDICINE | Admitting: INTERNAL MEDICINE
Payer: MEDICARE

## 2022-04-29 ENCOUNTER — ANESTHESIA EVENT (OUTPATIENT)
Dept: ENDOSCOPY | Age: 57
End: 2022-04-29
Payer: MEDICARE

## 2022-04-29 VITALS
DIASTOLIC BLOOD PRESSURE: 75 MMHG | OXYGEN SATURATION: 99 % | HEIGHT: 64 IN | WEIGHT: 208 LBS | BODY MASS INDEX: 35.51 KG/M2 | RESPIRATION RATE: 20 BRPM | TEMPERATURE: 96.9 F | HEART RATE: 73 BPM | SYSTOLIC BLOOD PRESSURE: 133 MMHG

## 2022-04-29 VITALS
DIASTOLIC BLOOD PRESSURE: 55 MMHG | OXYGEN SATURATION: 98 % | RESPIRATION RATE: 14 BRPM | SYSTOLIC BLOOD PRESSURE: 94 MMHG

## 2022-04-29 LAB — POTASSIUM SERPL-SCNC: 4.4 MMOL/L (ref 3.5–5.1)

## 2022-04-29 PROCEDURE — 2580000003 HC RX 258: Performed by: ANESTHESIOLOGY

## 2022-04-29 PROCEDURE — 84132 ASSAY OF SERUM POTASSIUM: CPT

## 2022-04-29 PROCEDURE — 7100000010 HC PHASE II RECOVERY - FIRST 15 MIN: Performed by: INTERNAL MEDICINE

## 2022-04-29 PROCEDURE — 2709999900 HC NON-CHARGEABLE SUPPLY: Performed by: INTERNAL MEDICINE

## 2022-04-29 PROCEDURE — C1753 CATH, INTRAVAS ULTRASOUND: HCPCS | Performed by: INTERNAL MEDICINE

## 2022-04-29 PROCEDURE — 7100000000 HC PACU RECOVERY - FIRST 15 MIN: Performed by: INTERNAL MEDICINE

## 2022-04-29 PROCEDURE — 6360000002 HC RX W HCPCS: Performed by: NURSE ANESTHETIST, CERTIFIED REGISTERED

## 2022-04-29 PROCEDURE — 3700000000 HC ANESTHESIA ATTENDED CARE: Performed by: INTERNAL MEDICINE

## 2022-04-29 PROCEDURE — 7100000001 HC PACU RECOVERY - ADDTL 15 MIN: Performed by: INTERNAL MEDICINE

## 2022-04-29 PROCEDURE — 3700000001 HC ADD 15 MINUTES (ANESTHESIA): Performed by: INTERNAL MEDICINE

## 2022-04-29 PROCEDURE — 36415 COLL VENOUS BLD VENIPUNCTURE: CPT

## 2022-04-29 PROCEDURE — 3609012400 HC EGD TRANSORAL BIOPSY SINGLE/MULTIPLE: Performed by: INTERNAL MEDICINE

## 2022-04-29 PROCEDURE — 2500000003 HC RX 250 WO HCPCS: Performed by: NURSE ANESTHETIST, CERTIFIED REGISTERED

## 2022-04-29 PROCEDURE — 3609018500 HC EGD US SCOPE W/ADJACENT STRUCTURES: Performed by: INTERNAL MEDICINE

## 2022-04-29 PROCEDURE — 7100000011 HC PHASE II RECOVERY - ADDTL 15 MIN: Performed by: INTERNAL MEDICINE

## 2022-04-29 PROCEDURE — 88305 TISSUE EXAM BY PATHOLOGIST: CPT

## 2022-04-29 RX ORDER — PROPOFOL 10 MG/ML
INJECTION, EMULSION INTRAVENOUS PRN
Status: DISCONTINUED | OUTPATIENT
Start: 2022-04-29 | End: 2022-04-29 | Stop reason: SDUPTHER

## 2022-04-29 RX ORDER — FENTANYL CITRATE 50 UG/ML
INJECTION, SOLUTION INTRAMUSCULAR; INTRAVENOUS PRN
Status: DISCONTINUED | OUTPATIENT
Start: 2022-04-29 | End: 2022-04-29 | Stop reason: SDUPTHER

## 2022-04-29 RX ORDER — METOPROLOL TARTRATE 5 MG/5ML
5 INJECTION INTRAVENOUS EVERY 6 HOURS
Status: DISCONTINUED | OUTPATIENT
Start: 2022-04-29 | End: 2022-04-29

## 2022-04-29 RX ORDER — LIDOCAINE HYDROCHLORIDE 20 MG/ML
INJECTION, SOLUTION EPIDURAL; INFILTRATION; INTRACAUDAL; PERINEURAL PRN
Status: DISCONTINUED | OUTPATIENT
Start: 2022-04-29 | End: 2022-04-29 | Stop reason: SDUPTHER

## 2022-04-29 RX ORDER — SODIUM CHLORIDE 9 MG/ML
INJECTION, SOLUTION INTRAVENOUS CONTINUOUS
Status: DISCONTINUED | OUTPATIENT
Start: 2022-04-29 | End: 2022-04-29 | Stop reason: HOSPADM

## 2022-04-29 RX ORDER — PANTOPRAZOLE SODIUM 40 MG/1
40 TABLET, DELAYED RELEASE ORAL
Qty: 90 TABLET | Refills: 1 | Status: SHIPPED | OUTPATIENT
Start: 2022-04-29

## 2022-04-29 RX ADMIN — PROPOFOL 150 MCG/KG/MIN: 10 INJECTION, EMULSION INTRAVENOUS at 11:31

## 2022-04-29 RX ADMIN — LIDOCAINE HYDROCHLORIDE 100 MG: 20 INJECTION, SOLUTION EPIDURAL; INFILTRATION; INTRACAUDAL; PERINEURAL at 11:30

## 2022-04-29 RX ADMIN — FENTANYL CITRATE 25 MCG: 50 INJECTION, SOLUTION INTRAMUSCULAR; INTRAVENOUS at 11:43

## 2022-04-29 RX ADMIN — FENTANYL CITRATE 25 MCG: 50 INJECTION, SOLUTION INTRAMUSCULAR; INTRAVENOUS at 11:41

## 2022-04-29 RX ADMIN — SODIUM CHLORIDE: 9 INJECTION, SOLUTION INTRAVENOUS at 10:36

## 2022-04-29 RX ADMIN — PROPOFOL 100 MG: 10 INJECTION, EMULSION INTRAVENOUS at 11:30

## 2022-04-29 RX ADMIN — PROPOFOL 50 MG: 10 INJECTION, EMULSION INTRAVENOUS at 11:34

## 2022-04-29 ASSESSMENT — PULMONARY FUNCTION TESTS
PIF_VALUE: 1
PIF_VALUE: 0
PIF_VALUE: 1
PIF_VALUE: 0
PIF_VALUE: 1

## 2022-04-29 NOTE — PROGRESS NOTES
Discharge instructions review with patient and pt godmother. All home medications have been reviewed, pt v/u. [place] Pt discharged via wheelchair. Pt discharged with instructions and all belongings. Godmother taking stable pt home.

## 2022-04-29 NOTE — H&P
Gastroenterology Note             Pre-operative History and Physical    Patient: Nickolas Wong  : 1965  CSN:     History Obtained From:  patient and/or guardian. HISTORY OF PRESENT ILLNESS:    The patient is a 60 y.o. female  here for EGD and endoscopic ultrasound this patient was in the office recently she is complaining again of weight loss severe abdominal pain she has had a previous resection of her distal pancreas today we are reevaluating her endoscopic ultrasound her resection was done at [facility]    Past Medical History:    Past Medical History:   Diagnosis Date    Asthma     Back pain     Cerebral artery occlusion with cerebral infarction ([facility]     Diabetes mellitus ([facility]     Hyperlipidemia     Hypertension     Spinal cord stimulator status      Past Surgical History:    Past Surgical History:   Procedure Laterality Date    ENDOSCOPY, COLON, DIAGNOSTIC      OTHER SURGICAL HISTORY  2017    EGD with biopsy    PANCREAS SURGERY      AL IMPLANT NEUROSTIM/ N/A 2018    REPLACEMENT / MOVE SPINAL CORD STIMULATOR BATTERY performed by Heather Manning MD at [place] [address]       Medications Prior to Admission:   No current facility-administered medications on file prior to encounter. Current Outpatient Medications on File Prior to Encounter   Medication Sig Dispense Refill    traMADol (ULTRAM) 50 MG tablet Take 50 mg by mouth every 8 hours as needed for Pain.       amitriptyline (ELAVIL) 25 MG tablet Take 25 mg by mouth 2 times daily Pt takes 25 mg in am and 50 mg HS      metoprolol tartrate (LOPRESSOR) 50 MG tablet Take 50 mg by mouth 2 times daily      lipase-protease-amylase (CREON) 58028-750361 units CPEP delayed release capsule Take by mouth 3 times daily (with meals)      ipratropium-albuterol (DUONEB) 0.5-2.5 (3) MG/3ML SOLN nebulizer solution Inhale 1 vial into the lungs every 6 hours as needed for Shortness of Breath      aspirin 81 MG chewable tablet Take 81 mg by mouth daily      amLODIPine (NORVASC) 10 MG tablet Take 10 mg by mouth daily      calcium citrate-vitamin D (CITRICAL + D) 315-250 MG-UNIT TABS per tablet Take 1 tablet by mouth daily (with breakfast) 120 tablet 2    metFORMIN (GLUCOPHAGE) 1000 MG tablet Take 1,000 mg by mouth daily (with breakfast)       atorvastatin (LIPITOR) 40 MG tablet Take 80 mg by mouth daily   0    albuterol (PROVENTIL HFA) 108 (90 BASE) MCG/ACT inhaler Inhale 2 puffs into the lungs every 4 hours as needed for Wheezing 1 Inhaler 3        Allergies:  Patient has no known allergies. Social History:   Social History     Tobacco Use    Smoking status: Current Every Day Smoker     Packs/day: 0.50     Years: 16.00     Pack years: 8.00     Types: Cigarettes    Smokeless tobacco: Former User     Quit date: 4/14/2017   Substance Use Topics    Alcohol use: No     Family History:   Family History   Problem Relation Age of Onset    High Blood Pressure Mother     Cancer Sister     High Blood Pressure Maternal Grandmother     High Blood Pressure Maternal Grandfather        PHYSICAL EXAM:      /81   Pulse 70   Temp 97.2 °F (36.2 °C) (Temporal)   Resp 18   Ht 5' 4\" (1.626 m)   Wt 208 lb (94.3 kg)   SpO2 98%   BMI 35.70 kg/m²  I        Heart:   RRR, normal s1s2    Lungs:  CTA bilat,  Normal effort    Abdomen:   NT, ND      ASA Grade:  ASA 3 - Patient with moderate systemic disease with functional limitations    Mallampati Class: 2          ASSESSMENT AND PLAN:    1. Patient is a 60 y.o. female here for EGD/euswith MAC.   2.  Procedure options, risks and benefits reviewed with patient. Patient expresses understanding.     Sy Mccrary MD,   [facility]  4/29/2022

## 2022-04-29 NOTE — PROCEDURES
Endoscopy Note    Patient: Rajendra Arauz  : 1965  CSN:     Procedure: Esophagogastroduodenoscopy with with biopsy and endoscopic ultrasound    Date:  2022     Surgeon:  Yelena Early MD     Referring Physician: Jacqueline Jean    Preoperative Diagnosis: Weight loss in the setting of having a previous distal pancreatectomy    Postoperative Diagnosis: #1 calcifications in the head of pancreas #2 normal-appearing pancreatic duct #3 normal-appearing common bile duct    Anesthesia: Monitored anesthesia care    EBL: <5 mL    Indications: This is a 60y.o. year old female who previously seen at [facility] and had a distal pancreatectomy she has had complications from her distal pancreatectomy including a previous cyst which was a very common issue after distal pancreatectomy as a leaking from the pancreatic duct I have been following her for approximately 3 years and she continues to have some issues with weight loss she was recently in the office and stated that she is lost again around 100 pounds and today we are repeating her endoscopic ultrasound to make sure that she has no recurrence of the tumor that she previously had when she had a distal pancreatectomy    Description of Procedure:  Informed consent was obtained from the patient after explanation of indications, benefits and possible risks and complications of the procedure. The patient was then taken to the endoscopy suite, placed in the left lateral decubitus position and the above IV sedation was administrered.     The Olympus linear EUS scope  Was placed over tongue and into the esophagus  The view with the scope of the esophagus revealed no abnormalities  We did go through a small hiatal hernia  The stomach had scattered inflammation and some blood so we did take a biopsy for H. pylori using a pediatric biopsy forceps  The duodenum was normal and the ampulla Vater appeared to be normal    Ultrasound    During on the ultrasound portion looking at the uncinate process we found no abnormalities looking at the head of the pancreas and the pancreatic parenchyma there were scattered calcifications which I did take a photo of    The pancreatic duct and the common bile duct came into the major ampulla without any difficulties looking at the pancreatic duct and tracing this I found no abnormalities with the pancreatic duct I did find the pancreatic duct measures 1.7 mm I found the common bile duct measuring 4.6 mm I found no stones in the common bile duct    Tracing the pancreatic duct past the portal vein into the distal pancreas there was no area of abnormality the pancreatic parenchyma does have some lobularity to it    But we find no discrete tumor    We found the adrenal gland the celiac ganglion and the celiac axis and they were all normal    Our view of the left lobe and the caudate lobe of the liver showed that the parenchyma was of normal character and size and shape there was no intrahepatic biliary dilation that we could find    With this we then terminated the procedure      Gastric or Duodenal ulcer present: No      The patient tolerated the procedure well and was taken to the post anesthesia care unit in good condition. Impression: #1 calcifications of the pancreas #2 no evidence of a recurrent tumor or mass on the head or body of the pancreas #3 a gastritis today with mild hemorrhage we did take biopsies      Recommendations:  Today's evaluation and our blood work evaluation have all come back normal which is good for this patient  We find no reason why she should have 100 pound weight loss her prealbumin her iron were all normal    Her EUS today shows no evidence that she has a recurrence of the tumor that she had in her distal pancreatectomy    I will continue to have patient follow-up with me in the office I like to see her back in 2 months to see how she is doing and we will continue to monitor her weight thank you for this very kind referral    Jennie Montero MD, MD  [facility]  [phone]

## 2022-04-29 NOTE — ANESTHESIA PRE PROCEDURE
Department of Anesthesiology  Preprocedure Note       Name:  Brendan Coronado   Age:  60 y.o.  :  1965                                          MRN:  [id number]         Date:  2022      Surgeon: Irma Kramer):  June Rose MD    Procedure: Procedure(s):  EGD ESOPHAGOGASTRODUODENOSCOPY ULTRASOUND    Medications prior to admission:   Prior to Admission medications    Medication Sig Start Date End Date Taking? Authorizing Provider   traMADol (ULTRAM) 50 MG tablet Take 50 mg by mouth every 8 hours as needed for Pain. Yes Historical Provider, MD   amitriptyline (ELAVIL) 25 MG tablet Take 25 mg by mouth 2 times daily Pt takes 25 mg in am and 50 mg HS   Yes Historical Provider, MD   metoprolol tartrate (LOPRESSOR) 50 MG tablet Take 50 mg by mouth 2 times daily   Yes Historical Provider, MD   lipase-protease-amylase (CREON) 62782-980082 units CPEP delayed release capsule Take by mouth 3 times daily (with meals)   Yes Historical Provider, MD   ipratropium-albuterol (DUONEB) 0.5-2.5 (3) MG/3ML SOLN nebulizer solution Inhale 1 vial into the lungs every 6 hours as needed for Shortness of Breath   Yes Historical Provider, MD   aspirin 81 MG chewable tablet Take 81 mg by mouth daily    Historical Provider, MD   amLODIPine (NORVASC) 10 MG tablet Take 10 mg by mouth daily    Historical Provider, MD   calcium citrate-vitamin D (CITRICAL + D) 315-250 MG-UNIT TABS per tablet Take 1 tablet by mouth daily (with breakfast) 3/1/17   Adrian Dear,    metFORMIN (GLUCOPHAGE) 1000 MG tablet Take 1,000 mg by mouth daily (with breakfast)     Historical Provider, MD   atorvastatin (LIPITOR) 40 MG tablet Take 80 mg by mouth daily  11/15/16   Historical Provider, MD   albuterol (PROVENTIL HFA) 108 (90 BASE) MCG/ACT inhaler Inhale 2 puffs into the lungs every 4 hours as needed for Wheezing 7/28/15   Adrian Aguillon MD       Current medications:    No current facility-administered medications for this encounter.        Allergies:  No Known Allergies    Problem List:    Patient Active Problem List   Diagnosis Code    Pneumonia J18.9    Morbid obesity with BMI of 50.0-59.9, adult ([facility] E66.01, Z68.43    Essential hypertension I10    Hyperlipidemia E78.5    Type 2 diabetes mellitus without complication, with long-term current use of insulin (HCC) E11.9, A37.2    Uncomplicated asthma A45.538    Cigarette nicotine dependence without complication P15.877    Chronic superficial gastritis without bleeding K29.30       Past Medical History:        Diagnosis Date    Asthma     Back pain     Cerebral artery occlusion with cerebral infarction ([facility] [address] [facility]     Diabetes mellitus [facility]     Hyperlipidemia     Hypertension     Spinal cord stimulator status        Past Surgical History:        Procedure Laterality Date    ENDOSCOPY, COLON, DIAGNOSTIC      OTHER SURGICAL HISTORY  06/23/2017    EGD with biopsy    PANCREAS SURGERY      OK IMPLANT NEUROSTIM/ N/A 8/21/2018    REPLACEMENT / MOVE SPINAL CORD STIMULATOR BATTERY performed by Ramon Varma MD at [place] [address]         Social History:    Social History     Tobacco Use    Smoking status: Current Every Day Smoker     Packs/day: 0.50     Years: 16.00     Pack years: 8.00     Types: Cigarettes    Smokeless tobacco: Former User     Quit date: 4/14/2017   Substance Use Topics    Alcohol use:  No                                Ready to quit: Not Answered  Counseling given: Not Answered      Vital Signs (Current):   Vitals:    04/22/22 1618   Weight: 208 lb (94.3 kg)   Height: 5' 4\" (1.626 m)                                              BP Readings from Last 3 Encounters:   02/16/21 124/84   08/27/20 (!) 140/87   08/31/18 121/69       NPO Status:  before mn                                                                               BMI:   Wt Readings from Last 3 Encounters:   04/22/22 208 lb (94.3 kg)   02/16/21 214 lb (97.1 kg)   08/27/20 241 lb 6.5 oz (109.5 kg)     Body mass index is 35.7 kg/m². CBC:   Lab Results   Component Value Date    WBC 9.0 02/16/2021    RBC 4.14 02/16/2021    HGB 11.7 02/16/2021    HCT 36.2 02/16/2021    MCV 87.4 02/16/2021    RDW 14.3 02/16/2021     02/16/2021       CMP:   Lab Results   Component Value Date     04/12/2022    K 5.7 04/12/2022    K 4.2 08/31/2018     04/12/2022    CO2 21 04/12/2022    BUN 15 04/12/2022    CREATININE 1.0 04/12/2022    GFRAA >60 04/12/2022    AGRATIO 1.4 04/12/2022    LABGLOM 57 04/12/2022    GLUCOSE 148 04/12/2022    PROT 7.3 04/12/2022    CALCIUM 9.8 04/12/2022    BILITOT <0.2 04/12/2022    ALKPHOS 126 04/12/2022    AST 31 04/12/2022    ALT 25 04/12/2022       POC Tests: No results for input(s): POCGLU, POCNA, POCK, POCCL, POCBUN, POCHEMO, POCHCT in the last 72 hours. Coags:   Lab Results   Component Value Date    PROTIME 10.4 08/21/2018    INR 0.91 08/21/2018    APTT 25.5 08/21/2018       HCG (If Applicable): No results found for: PREGTESTUR, PREGSERUM, HCG, HCGQUANT     ABGs: No results found for: PHART, PO2ART, PYS2ANG, PWR4ZIM, BEART, G0YCOKLD     Type & Screen (If Applicable):  No results found for: LABABO, LABRH    Drug/Infectious Status (If Applicable):  No results found for: HIV, HEPCAB    COVID-19 Screening (If Applicable): No results found for: COVID19        Anesthesia Evaluation  Patient summary reviewed  Airway: Mallampati: II  TM distance: >3 FB   Neck ROM: full  Mouth opening: > = 3 FB Dental:          Pulmonary:normal exam  breath sounds clear to auscultation  (+) pneumonia:  asthma:                            Cardiovascular:    (+) hypertension:,         Rhythm: regular  Rate: normal                    Neuro/Psych:   (+) CVA:,             GI/Hepatic/Renal:             Endo/Other:    (+) Diabetes, . Abdominal:             Vascular: Other Findings:             Anesthesia Plan      MAC     ASA 3       Induction: intravenous.       Anesthetic plan and risks discussed with patient. Plan discussed with CRNA.     Attending anesthesiologist reviewed and agrees with Jonelle Olmedo MD   4/29/2022

## 2022-04-29 NOTE — ANESTHESIA POSTPROCEDURE EVALUATION
Department of Anesthesiology  Postprocedure Note    Patient: Pa Arambula  MRN: [id number]  YOB: 1965  Date of evaluation: 4/29/2022  Time:  12:12 PM     Procedure Summary     Date: 04/29/22 Room / Location: [address] [place] [address]    Anesthesia Start: 1126 Anesthesia Stop: 1159    Procedures:       EGD ESOPHAGOGASTRODUODENOSCOPY ULTRASOUND (N/A )      EGD BIOPSY (N/A Abdomen) Diagnosis: (WEIGHT LOSS R63.4)    Surgeons: Mahesh García MD Responsible Provider: Lala Stone MD    Anesthesia Type: MAC ASA Status: 3          Anesthesia Type: MAC    Shad Phase I: Shad Score: 9    Shad Phase II:      Last vitals: Reviewed and per EMR flowsheets.        Anesthesia Post Evaluation    Patient location during evaluation: PACU  Patient participation: complete - patient participated  Level of consciousness: awake  Airway patency: patent  Nausea & Vomiting: no nausea and no vomiting  Complications: no  Cardiovascular status: blood pressure returned to baseline  Respiratory status: acceptable  Hydration status: stable

## 2022-04-29 NOTE — PROGRESS NOTES
Pt arrived from endo to PACU bay 3. Report received from endo staff. Pt asleep, easily arousable to voice. Pt arrives on room air, vitals as charted.

## 2022-04-29 NOTE — PROGRESS NOTES
Reviewed patient's medical and surgical history in electronic record and with patient at the bedside. All questions regarding procedure answered. Scope verified using 2 person system. Family in waiting room.     Electronically signed by Issa Townsend RN on 4/29/2022 at 11:32 AM

## 2022-07-19 LAB
AVERAGE GLUCOSE: NORMAL
HBA1C MFR BLD: 6.7 %

## 2023-03-11 ENCOUNTER — HOSPITAL ENCOUNTER (INPATIENT)
Age: 58
LOS: 2 days | Discharge: HOME | DRG: 065 | End: 2023-03-14
Attending: EMERGENCY MEDICINE | Admitting: SPECIALIST
Payer: MEDICARE

## 2023-03-11 ENCOUNTER — APPOINTMENT (OUTPATIENT)
Dept: CT IMAGING | Age: 58
DRG: 065 | End: 2023-03-11
Payer: MEDICARE

## 2023-03-11 ENCOUNTER — APPOINTMENT (OUTPATIENT)
Dept: GENERAL RADIOLOGY | Age: 58
DRG: 065 | End: 2023-03-11
Payer: MEDICARE

## 2023-03-11 DIAGNOSIS — I63.9 ACUTE CEREBROVASCULAR ACCIDENT (CVA) (HCC): ICD-10-CM

## 2023-03-11 DIAGNOSIS — R47.81 SLURRING OF SPEECH: Primary | ICD-10-CM

## 2023-03-11 DIAGNOSIS — R29.810 FACIAL DROOP: ICD-10-CM

## 2023-03-11 LAB
A/G RATIO: 1.1 (ref 1.1–2.2)
ALBUMIN SERPL-MCNC: 4.2 G/DL (ref 3.4–5)
ALP BLD-CCNC: 129 U/L (ref 40–129)
ALT SERPL-CCNC: 11 U/L (ref 10–40)
ANION GAP SERPL CALCULATED.3IONS-SCNC: 13 MMOL/L (ref 3–16)
AST SERPL-CCNC: 17 U/L (ref 15–37)
BASOPHILS ABSOLUTE: 0.1 K/UL (ref 0–0.2)
BASOPHILS RELATIVE PERCENT: 1.3 %
BILIRUB SERPL-MCNC: <0.2 MG/DL (ref 0–1)
BUN BLDV-MCNC: 19 MG/DL (ref 7–20)
CALCIUM SERPL-MCNC: 9.5 MG/DL (ref 8.3–10.6)
CHLORIDE BLD-SCNC: 106 MMOL/L (ref 99–110)
CO2: 19 MMOL/L (ref 21–32)
CREAT SERPL-MCNC: 1.5 MG/DL (ref 0.6–1.1)
EOSINOPHILS ABSOLUTE: 0.4 K/UL (ref 0–0.6)
EOSINOPHILS RELATIVE PERCENT: 5 %
GFR SERPL CREATININE-BSD FRML MDRD: 40 ML/MIN/{1.73_M2}
GLUCOSE BLD-MCNC: 197 MG/DL (ref 70–99)
GLUCOSE BLD-MCNC: 233 MG/DL (ref 70–99)
HCT VFR BLD CALC: 33.7 % (ref 36–48)
HEMOGLOBIN: 10.7 G/DL (ref 12–16)
INR BLD: 0.93 (ref 0.87–1.14)
LYMPHOCYTES ABSOLUTE: 2.2 K/UL (ref 1–5.1)
LYMPHOCYTES RELATIVE PERCENT: 27.5 %
MCH RBC QN AUTO: 28.2 PG (ref 26–34)
MCHC RBC AUTO-ENTMCNC: 31.6 G/DL (ref 31–36)
MCV RBC AUTO: 89.2 FL (ref 80–100)
MONOCYTES ABSOLUTE: 0.6 K/UL (ref 0–1.3)
MONOCYTES RELATIVE PERCENT: 7.8 %
NEUTROPHILS ABSOLUTE: 4.7 K/UL (ref 1.7–7.7)
NEUTROPHILS RELATIVE PERCENT: 58.4 %
PDW BLD-RTO: 16.5 % (ref 12.4–15.4)
PERFORMED ON: ABNORMAL
PLATELET # BLD: 319 K/UL (ref 135–450)
PMV BLD AUTO: 8.5 FL (ref 5–10.5)
POTASSIUM REFLEX MAGNESIUM: 3.6 MMOL/L (ref 3.5–5.1)
PROTHROMBIN TIME: 12.4 SEC (ref 11.7–14.5)
RBC # BLD: 3.78 M/UL (ref 4–5.2)
SODIUM BLD-SCNC: 138 MMOL/L (ref 136–145)
TOTAL PROTEIN: 7.9 G/DL (ref 6.4–8.2)
TROPONIN: <0.01 NG/ML
WBC # BLD: 8 K/UL (ref 4–11)

## 2023-03-11 PROCEDURE — 70450 CT HEAD/BRAIN W/O DYE: CPT

## 2023-03-11 PROCEDURE — 84484 ASSAY OF TROPONIN QUANT: CPT

## 2023-03-11 PROCEDURE — 36415 COLL VENOUS BLD VENIPUNCTURE: CPT

## 2023-03-11 PROCEDURE — 4A03X5D MEASUREMENT OF ARTERIAL FLOW, INTRACRANIAL, EXTERNAL APPROACH: ICD-10-PCS | Performed by: RADIOLOGY

## 2023-03-11 PROCEDURE — 6360000004 HC RX CONTRAST MEDICATION: Performed by: EMERGENCY MEDICINE

## 2023-03-11 PROCEDURE — 85610 PROTHROMBIN TIME: CPT

## 2023-03-11 PROCEDURE — 71045 X-RAY EXAM CHEST 1 VIEW: CPT

## 2023-03-11 PROCEDURE — 99285 EMERGENCY DEPT VISIT HI MDM: CPT

## 2023-03-11 PROCEDURE — 93005 ELECTROCARDIOGRAM TRACING: CPT | Performed by: EMERGENCY MEDICINE

## 2023-03-11 PROCEDURE — 85025 COMPLETE CBC W/AUTO DIFF WBC: CPT

## 2023-03-11 PROCEDURE — 70498 CT ANGIOGRAPHY NECK: CPT

## 2023-03-11 PROCEDURE — 80053 COMPREHEN METABOLIC PANEL: CPT

## 2023-03-11 RX ADMIN — IOPAMIDOL 75 ML: 755 INJECTION, SOLUTION INTRAVENOUS at 23:16

## 2023-03-11 ASSESSMENT — LIFESTYLE VARIABLES
HOW MANY STANDARD DRINKS CONTAINING ALCOHOL DO YOU HAVE ON A TYPICAL DAY: PATIENT DOES NOT DRINK
HOW OFTEN DO YOU HAVE A DRINK CONTAINING ALCOHOL: NEVER

## 2023-03-11 ASSESSMENT — PAIN DESCRIPTION - PAIN TYPE: TYPE: CHRONIC PAIN

## 2023-03-11 ASSESSMENT — PAIN DESCRIPTION - LOCATION: LOCATION: BACK

## 2023-03-11 ASSESSMENT — PAIN - FUNCTIONAL ASSESSMENT: PAIN_FUNCTIONAL_ASSESSMENT: 0-10

## 2023-03-11 ASSESSMENT — PAIN SCALES - GENERAL: PAINLEVEL_OUTOF10: 7

## 2023-03-12 PROBLEM — I63.9 ACUTE CEREBROVASCULAR ACCIDENT (CVA) (HCC): Status: ACTIVE | Noted: 2023-03-12

## 2023-03-12 LAB
EKG ATRIAL RATE: 84 BPM
EKG DIAGNOSIS: NORMAL
EKG P AXIS: 76 DEGREES
EKG P-R INTERVAL: 168 MS
EKG Q-T INTERVAL: 378 MS
EKG QRS DURATION: 82 MS
EKG QTC CALCULATION (BAZETT): 446 MS
EKG R AXIS: -2 DEGREES
EKG T AXIS: 52 DEGREES
EKG VENTRICULAR RATE: 84 BPM
GLUCOSE BLD-MCNC: 105 MG/DL (ref 70–99)
GLUCOSE BLD-MCNC: 138 MG/DL (ref 70–99)
GLUCOSE BLD-MCNC: 97 MG/DL (ref 70–99)
PERFORMED ON: ABNORMAL
PERFORMED ON: ABNORMAL
PERFORMED ON: NORMAL

## 2023-03-12 PROCEDURE — 6370000000 HC RX 637 (ALT 250 FOR IP): Performed by: SPECIALIST

## 2023-03-12 PROCEDURE — 93010 ELECTROCARDIOGRAM REPORT: CPT | Performed by: INTERNAL MEDICINE

## 2023-03-12 PROCEDURE — 1200000000 HC SEMI PRIVATE

## 2023-03-12 RX ORDER — POLYETHYLENE GLYCOL 3350 17 G/17G
17 POWDER, FOR SOLUTION ORAL DAILY PRN
COMMUNITY

## 2023-03-12 RX ORDER — B-COMPLEX WITH VITAMIN C
1 TABLET ORAL 2 TIMES DAILY WITH MEALS
COMMUNITY

## 2023-03-12 RX ORDER — PANTOPRAZOLE SODIUM 40 MG/1
40 TABLET, DELAYED RELEASE ORAL
Status: DISCONTINUED | OUTPATIENT
Start: 2023-03-12 | End: 2023-03-12 | Stop reason: ALTCHOICE

## 2023-03-12 RX ORDER — ASPIRIN 81 MG/1
81 TABLET, CHEWABLE ORAL DAILY
Status: DISCONTINUED | OUTPATIENT
Start: 2023-03-12 | End: 2023-03-14 | Stop reason: HOSPADM

## 2023-03-12 RX ORDER — AMITRIPTYLINE HYDROCHLORIDE 50 MG/1
50 TABLET, FILM COATED ORAL NIGHTLY
Status: DISCONTINUED | OUTPATIENT
Start: 2023-03-12 | End: 2023-03-14 | Stop reason: HOSPADM

## 2023-03-12 RX ORDER — METOPROLOL TARTRATE 50 MG/1
50 TABLET, FILM COATED ORAL 2 TIMES DAILY
Status: DISCONTINUED | OUTPATIENT
Start: 2023-03-12 | End: 2023-03-14 | Stop reason: HOSPADM

## 2023-03-12 RX ORDER — ACETAMINOPHEN 325 MG/1
650 TABLET ORAL EVERY 4 HOURS PRN
Status: DISCONTINUED | OUTPATIENT
Start: 2023-03-12 | End: 2023-03-14 | Stop reason: HOSPADM

## 2023-03-12 RX ORDER — AMLODIPINE BESYLATE 10 MG/1
10 TABLET ORAL DAILY
Status: DISCONTINUED | OUTPATIENT
Start: 2023-03-12 | End: 2023-03-14 | Stop reason: HOSPADM

## 2023-03-12 RX ORDER — ALBUTEROL SULFATE 90 UG/1
2 AEROSOL, METERED RESPIRATORY (INHALATION) EVERY 4 HOURS PRN
Status: DISCONTINUED | OUTPATIENT
Start: 2023-03-12 | End: 2023-03-14 | Stop reason: HOSPADM

## 2023-03-12 RX ORDER — IPRATROPIUM BROMIDE AND ALBUTEROL SULFATE 2.5; .5 MG/3ML; MG/3ML
1 SOLUTION RESPIRATORY (INHALATION) EVERY 4 HOURS PRN
Status: DISCONTINUED | OUTPATIENT
Start: 2023-03-12 | End: 2023-03-14 | Stop reason: HOSPADM

## 2023-03-12 RX ORDER — AMITRIPTYLINE HYDROCHLORIDE 25 MG/1
25 TABLET, FILM COATED ORAL 2 TIMES DAILY
Status: DISCONTINUED | OUTPATIENT
Start: 2023-03-12 | End: 2023-03-12 | Stop reason: DRUGHIGH

## 2023-03-12 RX ORDER — AMITRIPTYLINE HYDROCHLORIDE 25 MG/1
50 TABLET, FILM COATED ORAL NIGHTLY
Status: ON HOLD | COMMUNITY
End: 2023-03-14 | Stop reason: HOSPADM

## 2023-03-12 RX ORDER — TRAMADOL HYDROCHLORIDE 50 MG/1
50 TABLET ORAL EVERY 8 HOURS PRN
Status: DISCONTINUED | OUTPATIENT
Start: 2023-03-12 | End: 2023-03-14 | Stop reason: HOSPADM

## 2023-03-12 RX ORDER — AMITRIPTYLINE HYDROCHLORIDE 25 MG/1
25 TABLET, FILM COATED ORAL DAILY
Status: DISCONTINUED | OUTPATIENT
Start: 2023-03-12 | End: 2023-03-14 | Stop reason: HOSPADM

## 2023-03-12 RX ORDER — ATORVASTATIN CALCIUM 80 MG/1
80 TABLET, FILM COATED ORAL NIGHTLY
Status: DISCONTINUED | OUTPATIENT
Start: 2023-03-12 | End: 2023-03-14 | Stop reason: HOSPADM

## 2023-03-12 RX ADMIN — AMITRIPTYLINE HYDROCHLORIDE 50 MG: 50 TABLET, FILM COATED ORAL at 22:14

## 2023-03-12 RX ADMIN — METFORMIN HYDROCHLORIDE 500 MG: 500 TABLET ORAL at 10:24

## 2023-03-12 RX ADMIN — ATORVASTATIN CALCIUM 80 MG: 80 TABLET, FILM COATED ORAL at 22:14

## 2023-03-12 RX ADMIN — METOPROLOL TARTRATE 50 MG: 50 TABLET ORAL at 22:14

## 2023-03-12 RX ADMIN — ASPIRIN 81 MG CHEWABLE TABLET 81 MG: 81 TABLET CHEWABLE at 10:24

## 2023-03-12 RX ADMIN — METOPROLOL TARTRATE 50 MG: 50 TABLET ORAL at 10:24

## 2023-03-12 RX ADMIN — ASPIRIN 325 MG: 325 TABLET, COATED ORAL at 22:14

## 2023-03-12 RX ADMIN — TRAMADOL HYDROCHLORIDE 50 MG: 50 TABLET, COATED ORAL at 22:13

## 2023-03-12 RX ADMIN — AMLODIPINE BESYLATE 10 MG: 10 TABLET ORAL at 10:24

## 2023-03-12 RX ADMIN — ACETAMINOPHEN 650 MG: 325 TABLET ORAL at 13:12

## 2023-03-12 RX ADMIN — AMITRIPTYLINE HYDROCHLORIDE 25 MG: 25 TABLET, FILM COATED ORAL at 10:24

## 2023-03-12 RX ADMIN — METFORMIN HYDROCHLORIDE 500 MG: 500 TABLET ORAL at 17:48

## 2023-03-12 ASSESSMENT — ENCOUNTER SYMPTOMS
SHORTNESS OF BREATH: 0
EYE DISCHARGE: 0
COUGH: 0
CONSTIPATION: 0
ABDOMINAL PAIN: 0
VOMITING: 0
COLOR CHANGE: 0
EYE ITCHING: 0

## 2023-03-12 ASSESSMENT — PAIN - FUNCTIONAL ASSESSMENT

## 2023-03-12 ASSESSMENT — PAIN DESCRIPTION - FREQUENCY
FREQUENCY: CONTINUOUS

## 2023-03-12 ASSESSMENT — PAIN DESCRIPTION - PAIN TYPE
TYPE: ACUTE PAIN
TYPE: ACUTE PAIN
TYPE: CHRONIC PAIN
TYPE: ACUTE PAIN

## 2023-03-12 ASSESSMENT — PAIN DESCRIPTION - ORIENTATION
ORIENTATION: MID;ANTERIOR
ORIENTATION: ANTERIOR;MID
ORIENTATION: MID
ORIENTATION: LOWER
ORIENTATION: MID;ANTERIOR

## 2023-03-12 ASSESSMENT — PAIN DESCRIPTION - ONSET
ONSET: ON-GOING
ONSET: GRADUAL
ONSET: ON-GOING

## 2023-03-12 ASSESSMENT — PAIN SCALES - GENERAL
PAINLEVEL_OUTOF10: 5
PAINLEVEL_OUTOF10: 4
PAINLEVEL_OUTOF10: 4
PAINLEVEL_OUTOF10: 6
PAINLEVEL_OUTOF10: 6
PAINLEVEL_OUTOF10: 0
PAINLEVEL_OUTOF10: 6
PAINLEVEL_OUTOF10: 6
PAINLEVEL_OUTOF10: 5

## 2023-03-12 ASSESSMENT — PAIN DESCRIPTION - DESCRIPTORS
DESCRIPTORS: ACHING

## 2023-03-12 ASSESSMENT — PAIN DESCRIPTION - LOCATION
LOCATION: HEAD
LOCATION: BACK
LOCATION: HEAD
LOCATION: HEAD

## 2023-03-12 NOTE — ED NOTES
Pt ambulated to restroom. Tolerated well. No additional needs at this time.       Joanne Taylor, RN  03/12/23 0383

## 2023-03-12 NOTE — PROGRESS NOTES
Patient arrived to the floor at 0130 from [facility] ED. NIHSS Stroke Scale at bedside by ED RN and PCU RN. Patient scores a 1 on the scale, for mild slurring of words. No facial droop.

## 2023-03-12 NOTE — PROGRESS NOTES
NAME:  Robert Castro  YOB: 1965  MEDICAL RECORD NUMBER:  [id number]  TODAYS DATE:  3/12/2023    Discussed personal risk factors for Stroke/TIA with patient/family, and ways to reduce the risk for a recurrent stroke. Patient's personal risk factors which were identified are:     [] Alcohol Abuse: check with your physician before any alcohol consumption. [] Atrial fibrillation: may cause blood clots. [] Drug Abuse: Seek help, talk with your doctor  [] Clotting Disorder  [x] Diabetes  [] Family history of stroke or heart disease  [x] High Blood Pressure/Hypertension: work with your physician. [x] High cholesterol: monitor cholesterol levels with your physician. [x] Overweight/Obesity: work with your physician for your ideal body weight.  [] Physical Inactivity: get regular exercise as directed by your physician. [x] Personal history of previous TIA or stroke  [] Poor Diet; decrease salt (sodium) in your diet, follow diet directed by physician. [x] Smoking: Cigarette/Cigar: stop smoking. Advised pt. that you can reduce your risk for stroke/TIA by modifying/controlling the risk factors that you have. Pt.advised to take the medications as prescribed, which will be detailed in the discharge instructions, and to not stop taking them without consulting their physician. In addition, pt. advised to maintain a healthy diet, exercise regularly and to not smoke. [facility]'s Stroke treatment and prevention, Managing your recovery  notebook  provided and/or reviewed  with patient/family. The notebook includes, but not limited to, sections addressing warning signs & symptoms of a stroke, which are: sudden numbness or weakness especially on one side of the body, sudden confusion, difficulty speaking or understanding, sudden changes in vision, sudden dizziness or loss of balance/ coordination, sudden severe headache, syncope and seizure.   The need to call EMS (911) immediately if signs & symptoms occur is emphasized . The notebook also provides education on Stroke community resources and stroke advocacy. The need for follow-up after discharge was highlighted with patient/family with them being able to repeat understanding of the importance of this.       Electronically signed by Carole Pedersen RN on 3/12/2023 at 4:24 AM

## 2023-03-12 NOTE — H&P
[address]           [address]                              HISTORY AND PHYSICAL    PATIENT NAME: Oh Bull                    :        1965  MED REC NO:   [id number]                          ROOM:       7087  ACCOUNT NO:   [de-identified]                           ADMIT DATE: 2023  PROVIDER:     Ilia Morgan MD    ATTENDING PROVIDER:  Ilia Morgan MD    CHIEF COMPLAINT:  Some slurred speech last night. HISTORY OF PRESENT ILLNESS:  This 33-year-old female patient who has  three children, but they live differently. She lives alone at home  where last night she tried to drink some juice and came out from the  mouth and also developed acute slurred speech. It was around 7 p.m. The patient came to the emergency room for evaluation. The patient had  evaluation for acute stroke. The patient qualified for NIH, did not  qualify for any acute tPA administration. The patient had a CT scan  which showed some may be new right frontal lesion. The patient has old  parietal lesion. The patient's slurred speech recovered this morning  doing fine. Able to swallow and strength on both sides almost equal,  left is little bit weaker. No confusion. The patient denies any chest  pain, shortness of breath, cough. The patient's CT angiogram showed  very narrowing of the mid cerebral arteries in the brain. PAST MEDICAL HISTORY:  Significant for old stroke with right parietal  lesion, obesity, diabetes mellitus, hypertension, hyperlipidemia, and  smoking. PAST SURGICAL HISTORY:  Significant for colonoscopy, Pancrease surgery,  rotator cuff repair, tubal ligation. FAMILY HISTORY:  Significant for _____ sister had some cancer. SOCIAL HISTORY:  The patient is , has children. Live by herself. History of smoking. No drinking. MEDICATIONS:  See the reconciliation sheet.     ALLERGIES:  No known allergies. REVIEW OF SYSTEMS:  No headache. No visual symptoms. The slurred  speech resolved. Facial weakness also resolved. No sore throat. No  cough. No chest pain. No palpitations. No pleuritic pain. No cough. No nausea, vomiting, abdominal pain. Denies any dysuria, urgency. Had  some mild weakness on the left side, some mild arthralgia. PHYSICAL EXAMINATION:  GENERAL:  The patient is alert, oriented, well-developed,  well-nourished, moderately obese. VITAL SIGNS:  The patient's blood pressure was 134/79, respiratory rate  was 21, pulse 89, temperature 97.7. The patient weighed 189 pounds. Saturation is 100% on room air. HEENT:  Head normocephalic, atraumatic. Pupils equal on both sides,  reactive to light and accommodation. Ear, Nose and Throat: Within  normal limits. Mucous membranes moist.  NECK:  Supple. No JVD. No lymphadenopathy. No thyromegaly. CHEST:  Lungs clear bilaterally. No wheezing. CARDIOVASCULAR:  Heart S1 and S2 regular rhythm. ABDOMEN:  Soft. Bowel sounds present. No mass. No hepatosplenomegaly. EXTREMITIES:  No edema. No cyanosis. No clubbing. SKIN:  Warm and dry. LABORATORY STUDIES:  WBC count was 8.0, hemoglobin 10.3, hematocrit  33.7, platelets count 821. Sodium was 138, potassium 3.6, chloride 106,  CO2 is 19, BUN 19, creatinine 1.5. Glucose 197 and 233. LFTs are  normal.  CT angiogram showed severe focal stenosis on the left middle  cerebral artery on M1 bifurcation and also severe stenosis on the right  middle cerebral artery. CT scan of the head showed no acute hemorrhage,  chronic small vessel ischemic changes, old right parietal stroke and  some focal patchy hyperattenuation on the right frontal area. We  recommend may be MRI.     ASSESSMENT:  Acute slurred speech, left facial droop, acute TIA, right  frontal insular hyperattenuation area, severe left and right mid  cerebral artery stenosis, hypertension, diabetes mellitus, old right  parietal stroke, mild renal insufficiency, history of smoking. PLAN:  Continue the home medications. Neuro consult. PT/OT. Might  need MRI. Follow up labs. The patient is full code. I spent more than 30 minutes face-to-face evaluation with the patient  and discussed management and findings with the patient and nursing  staff.         Shakir Randhawa MD    D: 03/12/2023 7:18:19       T: 03/12/2023 11:43:44     [email or web address]  Job#: [id number]     Doc#: [id number]    CC:

## 2023-03-12 NOTE — PROGRESS NOTES
4 Eyes Skin Assessment     The patient is being assess for  Admission    I agree that 2 RN's have performed a thorough Head to Toe Skin Assessment on the patient. ALL assessment sites listed below have been assessed on admission. Areas assessed by both nurses:   [x]   Head, Face, and Ears   [x]   Shoulders, Back, and Chest  [x]   Arms, Elbows, and Hands   [x]   Coccyx, Sacrum, and Ischum  [x]   Legs, Feet, and Heels        Does the Patient have Skin Breakdown?   No         [place] Prevention initiated:  No   Wound Care Orders initiated:  No      [facility] nurse consulted for Pressure Injury (Stage 3,4, Unstageable, DTI, NWPT, and Complex wounds):  No      Nurse 1 eSignature: Electronically signed by Alberto Hutson RN on 3/12/23 at 4:06 AM EDT    **SHARE this note so that the co-signing nurse is able to place an eSignature**    Nurse 2 eSignature: Electronically signed by Angeles Greenfield RN on 3/12/23 at 4:07 AM EDT

## 2023-03-12 NOTE — PROGRESS NOTES
Pharmacy Medication Reconciliation Note     List of medications patient is currently taking is complete. Source of information:   1. Conversation with patient   2. EMR    Notes regarding home medications:   1.  Patient no longer taking pantoprazole or Creon      Theresa Ruiz PharmD  3/12/2023 9:43 AM

## 2023-03-12 NOTE — PROGRESS NOTES
[facility]  Personalized Stroke Treatment Plan  My Stroke Type:   [] Ischemic Stroke (Blockage of blood flow to the brain)     [] TIA - Transient Ischemic Attack (mini-stroke)    Personal risk factors you can control include:  (Pending MRI)     [] Alcohol Abuse: check with your physician before any alcohol consumption. [] Atrial fibrillation: may cause blood clots. [] Drug Abuse: Seek help, talk with your doctor  [] Clotting Disorder  [x] Diabetes  [x] Family history of stroke or heart disease  [x] High Blood Pressure/Hypertension: work with your physician. [x] High cholesterol: monitor cholesterol levels with your physician. [x] Overweight/Obesity: work with your physician for your ideal body weight. [x] Physical Inactivity: get regular exercise as directed by your physician. [x] Personal history of previous TIA or stroke  [x] Poor Diet; decrease salt (sodium) in your diet, follow diet directed by physician. [x] Smoking: Cigarette/Cigar: stop smoking. Follow up with your physician is important after discharge. TAKE all medications as prescribed. Do not stop taking any medications   without talking to your physician. BE FAST is a simple way to remember the main symptoms of stroke. Recognizing these symptoms helps you know when to call for medical help. BE FAST stands for:                                                 B Balance problems                                                 E Eyes, vision lost        F  Face Drooping      A  Arm Weakness        S  Speech Difficulty      T  Time to Call 9-1-1  DO NOT DELAY THIS! Educated patient and/or family on personal risk factors for stroke/TIA. Included ways to reduce the risk for recurrent stroke. [facility] [employer]'s Stroke treatment and prevention, Managing your recovery  notebook  provided to patient. The notebook includes, but not limited to, sections addressing warning signs & symptoms of a stroke.  The need to call EMS [phone]) immediately if signs & symptoms occur is emphasized . Medication information will be reviewed at discharge. The notebook also provides education on Stroke community resources and stroke advocacy.     Electronically signed by Electronically signed by Derick Richardson RN on 3/12/2023 at 5:27 PM

## 2023-03-12 NOTE — CONSULTS
Neurology Consult - 03/12/23    Impression:  Left sided weakness, dysarthria, and left face weakness. Old vs new. TIA vs stroke vs recrudescence of old stroke sx. Hx of stroke. Multiple stroke risk factors. Plan:   MRI brain. Echo. Continue antiplatelet for secondary stroke prevention. Consider adding Plavix. Continue statin agent for secondary stroke prevention. Goal LDL<70. Permissive hypertension for 24 hours. Avoid hypotension. Continue telemetry to assess for atrial fibrillation. Recommend attempts at normoglycemia when possible. Rehab efforts as needed. Above discussed with patient and nursing. See dictated note. #[id number]        --  Thank you for allowing me to participate in the care of your patient with high level of medical complexity requiring a high level of medical decision making. If I can be of any further assistance, please do not hesitate to contact me. Pramod Fajardo, DO  [address] [place] [address] [address] Neuroscience

## 2023-03-12 NOTE — PLAN OF CARE
Problem: Discharge Planning  Goal: Discharge to home or other facility with appropriate resources  Outcome: Progressing     Problem: Pain  Goal: Verbalizes/displays adequate comfort level or baseline comfort level  Outcome: Progressing     Problem: Safety - Adult  Goal: Free from fall injury  Outcome: Progressing     Problem: Neurosensory - Adult  Goal: Achieves stable or improved neurological status  Outcome: Progressing  Goal: Achieves maximal functionality and self care  Outcome: Progressing

## 2023-03-12 NOTE — ED PROVIDER NOTES
I PERSONALLY SAW THE PATIENT AND PERFORMED A SUBSTANTIVE PORTION OF THE VISIT INCLUDING ALL ASPECTS OF THE MEDICAL DECISION MAKING PROCESS. [address]      Pt Name: Shahram Chauhan  MRN: [id number]  [place] 1965  Date of evaluation: 3/11/2023  Provider: April Wade MD    CHIEF COMPLAINT       Chief Complaint   Patient presents with    Aphasia     Lkw: 1900. PREVIOUS HX OF STROKE. 14-tia; 16-cva with left sided weakness. New symptoms include aphasia. Slurred speech and unable to complete words. Facial dropping on right side. Pt was drinking water and was drooling.  per ems        HISTORY OF PRESENT ILLNESS    Shahram Chauhan is a 59 y.o. female who presents to the emergency department with symptoms. Patient presents with facial droop and slurring of speech. Started at 7 PM.  History of stroke. States it feels similar. No chest pain or shortness of breath. No abdominal pain. Happened many times in the past.  Symptoms are improving on arrival.  States the facial droop is gone now. Just endorses mild slurring of speech. No other associated symptoms. Nursing Notes were reviewed. Including nursing noted for FM, Surgical History, Past Medical History, Social History, vitals, and allergies; agree with all. REVIEW OF SYSTEMS       Review of Systems   Constitutional:  Negative for diaphoresis and unexpected weight change. HENT:  Negative for congestion and dental problem. Eyes:  Negative for discharge and itching. Respiratory:  Negative for cough and shortness of breath. Cardiovascular:  Negative for chest pain and leg swelling. Gastrointestinal:  Negative for abdominal pain, constipation and vomiting. Endocrine: Negative for cold intolerance and heat intolerance. Genitourinary:  Negative for vaginal bleeding, vaginal discharge and vaginal pain. Musculoskeletal:  Negative for neck pain and neck stiffness.    Skin:  Negative for color change and pallor. Neurological:  Positive for speech difficulty. Negative for tremors. Psychiatric/Behavioral:  Negative for agitation and behavioral problems. Except as noted above the remainder of the review of systems was reviewed and negative. PAST MEDICAL HISTORY     Past Medical History:   Diagnosis Date    Asthma     Back pain     Cerebral artery occlusion with cerebral infarction [address] [address]     Diabetes mellitus ([facility] [address]     Hyperlipidemia     Hypertension     Spinal cord stimulator status        SURGICAL HISTORY       Past Surgical History:   Procedure Laterality Date    ENDOSCOPY, COLON, DIAGNOSTIC      OTHER SURGICAL HISTORY  06/23/2017    EGD with biopsy    PANCREAS SURGERY      MN INSJ/RPLCMT SPI NPGR DIR/INDUXIVE COUPLING N/A 8/21/2018    REPLACEMENT / MOVE SPINAL CORD STIMULATOR BATTERY performed by Yamini Lozano MD at [address] ENDOSCOPY N/A 4/29/2022    EGD ESOPHAGOGASTRODUODENOSCOPY ULTRASOUND performed by Jimenez Garcia MD at [place] [address] N/A 4/29/2022    EGD BIOPSY performed by Jimenez Garcia MD at [address] [place] [address]       Current Discharge Medication List        CONTINUE these medications which have NOT CHANGED    Details   pantoprazole (PROTONIX) 40 MG tablet Take 1 tablet by mouth every morning (before breakfast)  Qty: 90 tablet, Refills: 1      traMADol (ULTRAM) 50 MG tablet Take 50 mg by mouth every 8 hours as needed for Pain.       amitriptyline (ELAVIL) 25 MG tablet Take 25 mg by mouth 2 times daily Pt takes 25 mg in am and 50 mg HS      metoprolol tartrate (LOPRESSOR) 50 MG tablet Take 50 mg by mouth 2 times daily      lipase-protease-amylase (CREON) 10495-884259 units CPEP delayed release capsule Take by mouth 3 times daily (with meals)      ipratropium-albuterol (DUONEB) 0.5-2.5 (3) MG/3ML SOLN nebulizer solution Inhale 1 vial into the lungs every 6 hours as needed for Shortness of Breath      aspirin 81 MG chewable tablet Take 81 mg by mouth daily      amLODIPine (NORVASC) 10 MG tablet Take 10 mg by mouth daily      calcium citrate-vitamin D (CITRICAL + D) 315-250 MG-UNIT TABS per tablet Take 1 tablet by mouth daily (with breakfast)  Qty: 120 tablet, Refills: 2      metFORMIN (GLUCOPHAGE) 1000 MG tablet Take 1,000 mg by mouth daily (with breakfast)       atorvastatin (LIPITOR) 40 MG tablet Take 80 mg by mouth daily   Refills: 0      albuterol (PROVENTIL HFA) 108 (90 BASE) MCG/ACT inhaler Inhale 2 puffs into the lungs every 4 hours as needed for Wheezing  Qty: 1 Inhaler, Refills: 3             ALLERGIES     Patient has no known allergies. FAMILY HISTORY        Family History   Problem Relation Age of Onset    High Blood Pressure Mother     Cancer Sister     High Blood Pressure Maternal Grandmother     High Blood Pressure Maternal Grandfather        SOCIAL HISTORY       Social History     Socioeconomic History    Marital status:      Spouse name: None    Number of children: None    Years of education: None    Highest education level: None   Tobacco Use    Smoking status: Every Day     Packs/day: 0.50     Years: 16.00     Pack years: 8.00     Types: Cigarettes    Smokeless tobacco: Former     Quit date: 4/14/2017   Substance and Sexual Activity    Alcohol use: No    Drug use: Yes     Types: Opiates , Marijuana (Weed)       PHYSICAL EXAM       ED Triage Vitals [03/11/23 2256]   BP Temp Temp Source Heart Rate Resp SpO2 Height Weight   134/79 97.7 °F (36.5 °C) Oral 89 21 100 % 5' 5\" (1.651 m) 189 lb 9.5 oz (86 kg)       Physical Exam  Vitals and nursing note reviewed. Constitutional:       General: She is not in acute distress. Appearance: She is well-developed. She is not ill-appearing, toxic-appearing or diaphoretic. HENT:      Head: Normocephalic and atraumatic.       Right Ear: External ear normal.      Left Ear: External ear normal. Eyes:      General:         Right eye: No discharge. Left eye: No discharge. Conjunctiva/sclera: Conjunctivae normal.      Pupils: Pupils are equal, round, and reactive to light. Cardiovascular:      Rate and Rhythm: Normal rate and regular rhythm. Heart sounds: No murmur heard. Pulmonary:      Effort: Pulmonary effort is normal. No respiratory distress. Breath sounds: Normal breath sounds. No wheezing or rales. Abdominal:      General: Bowel sounds are normal. There is no distension. Palpations: Abdomen is soft. There is no mass. Tenderness: There is no abdominal tenderness. There is no guarding or rebound. Genitourinary:     Comments: Deferred  Musculoskeletal:         General: No deformity. Normal range of motion. Cervical back: Normal range of motion and neck supple. Skin:     General: Skin is warm. Findings: No erythema or rash. Neurological:      Mental Status: She is alert and oriented to person, place, and time. She is not disoriented. Cranial Nerves: No cranial nerve deficit. Motor: No atrophy or abnormal muscle tone. Coordination: Coordination normal.      Comments: NIH of 1 for mild slurring of speech. No facial droop appreciated at this time. Chronic weakness of left side per patient. Not any worse than usual.  Otherwise no focal deficits. Coordination intact. Normal smile and eyebrow movement. No weakness to the right side. Psychiatric:         Behavior: Behavior normal.         Thought Content: Thought content normal.       DIAGNOSTIC RESULTS     RADIOLOGY:   Non-plain film images such as CT, Ultrasoundand MRI are read by the radiologist. Plain radiographic images are visualized and preliminarily interpreted by the emergency physician with the below findings:    Impression   1. Focal severe stenosis/near occlusion of the left middle cerebral artery   M1 bifurcation.        2.  Focal severe stenosis/near occlusion of a right middle cerebral artery M2   branch. 2.  No hemodynamically significant stenosis in the bilateral cervical   internal carotid arteries per NASCET criteria. This scan was analyzed using Viz. ai contact LVO. Identification of suspected   findings is not for diagnostic use beyond notification. Viz LVO is limited to   analysis of imaging data and should not be used in-lieu of full patient   evaluation or relied upon to make or confirm diagnosis. ED BEDSIDE ULTRASOUND:   Performed by ED Physician - none    LABS:  Labs Reviewed   CBC WITH AUTO DIFFERENTIAL - Abnormal; Notable for the following components:       Result Value    RBC 3.78 (*)     Hemoglobin 10.7 (*)     Hematocrit 33.7 (*)     RDW 16.5 (*)     All other components within normal limits   COMPREHENSIVE METABOLIC PANEL W/ REFLEX TO MG FOR LOW K - Abnormal; Notable for the following components:    CO2 19 (*)     Glucose 197 (*)     Creatinine 1.5 (*)     Est, Glom Filt Rate 40 (*)     All other components within normal limits   POCT GLUCOSE - Abnormal; Notable for the following components:    POC Glucose 233 (*)     All other components within normal limits   TROPONIN   PROTIME-INR   POCT GLUCOSE       All other labs were withinnormal range or not returned as of this dictation. EMERGENCY DEPARTMENT COURSE and DIFFERENTIAL DIAGNOSIS/MDM:     PMH, Surgical Hx, FH, Social Hx reviewed by myself (ETOH usage, Tobacco usage, Drug usage reviewed by myself, no pertinent Hx)- No Pertinent Hx     Old records were reviewed by me     MDM 78-year-old with strokelike symptoms. Slurring of speech and left facial droop. Improving on arrival.  NIH 1 for mild slurring of speech. No facial droop appreciated. Passed swallow eval.  Code stroke called. Patient not a tPA candidate secondary to improving NIH of only 1. Non-debilitating symptoms. No thrombectomy candidate per stroke neurology. Discussed case with Dr. Howie Hraris.   In agreement that no tPA or thrombectomy needed. Admission for MRI and further evaluation. Discussed with patient's PCP who will admit. DDX-strokelike symptoms  Social Determinants (Poverty, Education, uninsured) -unknown  Prior notes-. Reviewed history of multiple TIAs and strokes with left-sided deficit chronically  Name and route all medications-   Orders Placed This Encounter   Medications    iopamidol (ISOVUE-370) 76 % injection 75 mL       Charting interpretations all by myself-CT as above  Diagnosis descriptions-NIH of 1 mild slurring of speech  Consults-hospitalist  Disposition- Considered -admission    I PERSONALLY SAW THE PATIENT AND PERFORMED A SUBSTANTIVE PORTION OF THE VISIT INCLUDING ALL ASPECTS OF THE MEDICAL DECISION MAKING PROCESS. The primary clinician of record [address]   Total Critical Caretime was 39 minutes, excluding separately reportable procedures. There was a high probability of clinically significant/life threatening deterioration in the patient's condition which required my urgent intervention. CRITICAL CARE  I personally saw the patient and independently provided 39 minutes of non-concurrent critical care out of the total shared critical care time provided. This excludes seperately billable procedures. Critical care time was provided for patient as above that required close evaluation and/or intervention with concern for potential patient decompensation. PROCEDURES:  Unlessotherwise noted below, none    FINAL IMPRESSION      1. Slurring of speech    2. Facial droop          DISPOSITION/PLAN   DISPOSITION Admitted 03/12/2023 12:28:49 AM    PATIENT REFERRED TO:  No follow-up provider specified.     DISCHARGE MEDICATIONS:  Current Discharge Medication List             (Please note that portions ofthis note were completed with a voice recognition program.  Efforts were made to edit the dictations but occasionally words are mis-transcribed.)    Karuna De Leon MD(electronically signed)  Attending Emergency Physician            Mehdi Mohamud MD  03/12/23 6717

## 2023-03-12 NOTE — H&P
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

## 2023-03-12 NOTE — ED NOTES
Report given to TANNA BUSCH [facility], RN. SBAR discussed. All questions answered. RN verbalized understanding.       Kasia Pascal RN  03/12/23 0196

## 2023-03-13 ENCOUNTER — APPOINTMENT (OUTPATIENT)
Dept: MRI IMAGING | Age: 58
DRG: 065 | End: 2023-03-13
Payer: MEDICARE

## 2023-03-13 LAB
GLUCOSE BLD-MCNC: 105 MG/DL (ref 70–99)
GLUCOSE BLD-MCNC: 113 MG/DL (ref 70–99)
GLUCOSE BLD-MCNC: 130 MG/DL (ref 70–99)
GLUCOSE BLD-MCNC: 150 MG/DL (ref 70–99)
LV EF: 58 %
LVEF MODALITY: NORMAL
PERFORMED ON: ABNORMAL

## 2023-03-13 PROCEDURE — C8929 TTE W OR WO FOL WCON,DOPPLER: HCPCS

## 2023-03-13 PROCEDURE — 97161 PT EVAL LOW COMPLEX 20 MIN: CPT

## 2023-03-13 PROCEDURE — 1200000000 HC SEMI PRIVATE

## 2023-03-13 PROCEDURE — 70551 MRI BRAIN STEM W/O DYE: CPT

## 2023-03-13 PROCEDURE — 94760 N-INVAS EAR/PLS OXIMETRY 1: CPT

## 2023-03-13 PROCEDURE — 97530 THERAPEUTIC ACTIVITIES: CPT

## 2023-03-13 PROCEDURE — 6370000000 HC RX 637 (ALT 250 FOR IP): Performed by: SPECIALIST

## 2023-03-13 RX ORDER — LORAZEPAM 2 MG/ML
1 INJECTION INTRAMUSCULAR ONCE
Status: DISCONTINUED | OUTPATIENT
Start: 2023-03-13 | End: 2023-03-13

## 2023-03-13 RX ORDER — LORAZEPAM 2 MG/ML
0.5 INJECTION INTRAMUSCULAR ONCE
Status: DISCONTINUED | OUTPATIENT
Start: 2023-03-13 | End: 2023-03-14 | Stop reason: HOSPADM

## 2023-03-13 RX ADMIN — METFORMIN HYDROCHLORIDE 500 MG: 500 TABLET ORAL at 08:52

## 2023-03-13 RX ADMIN — AMITRIPTYLINE HYDROCHLORIDE 50 MG: 50 TABLET, FILM COATED ORAL at 20:16

## 2023-03-13 RX ADMIN — TRAMADOL HYDROCHLORIDE 50 MG: 50 TABLET, COATED ORAL at 20:07

## 2023-03-13 RX ADMIN — METOPROLOL TARTRATE 50 MG: 50 TABLET ORAL at 08:53

## 2023-03-13 RX ADMIN — AMLODIPINE BESYLATE 10 MG: 10 TABLET ORAL at 08:53

## 2023-03-13 RX ADMIN — METOPROLOL TARTRATE 50 MG: 50 TABLET ORAL at 20:16

## 2023-03-13 RX ADMIN — ASPIRIN 81 MG CHEWABLE TABLET 81 MG: 81 TABLET CHEWABLE at 08:52

## 2023-03-13 RX ADMIN — AMITRIPTYLINE HYDROCHLORIDE 25 MG: 25 TABLET, FILM COATED ORAL at 08:52

## 2023-03-13 RX ADMIN — ATORVASTATIN CALCIUM 80 MG: 80 TABLET, FILM COATED ORAL at 20:16

## 2023-03-13 ASSESSMENT — PAIN DESCRIPTION - PAIN TYPE: TYPE: ACUTE PAIN

## 2023-03-13 ASSESSMENT — PAIN DESCRIPTION - LOCATION: LOCATION: HEAD

## 2023-03-13 ASSESSMENT — PAIN DESCRIPTION - DESCRIPTORS: DESCRIPTORS: ACHING

## 2023-03-13 ASSESSMENT — PAIN DESCRIPTION - ORIENTATION: ORIENTATION: MID;ANTERIOR

## 2023-03-13 ASSESSMENT — PAIN DESCRIPTION - FREQUENCY: FREQUENCY: CONTINUOUS

## 2023-03-13 ASSESSMENT — PAIN - FUNCTIONAL ASSESSMENT: PAIN_FUNCTIONAL_ASSESSMENT: ACTIVITIES ARE NOT PREVENTED

## 2023-03-13 ASSESSMENT — PAIN SCALES - GENERAL
PAINLEVEL_OUTOF10: 7
PAINLEVEL_OUTOF10: 0
PAINLEVEL_OUTOF10: 0

## 2023-03-13 ASSESSMENT — PAIN DESCRIPTION - ONSET: ONSET: ON-GOING

## 2023-03-13 NOTE — PROGRESS NOTES
Physical Therapy  Facility/Department: [facility] PROGRESSIVE CARE  Physical Therapy Initial Assessment    Name: Maria Esther Jaramillo  : 1965  MRN: [id number]  Date of Service: 3/13/2023    Discharge Recommendations:  No therapy recommended at discharge, Home independently   PT Equipment Recommendations  Equipment Needed: No      Patient Diagnosis(es): The primary encounter diagnosis was Slurring of speech. A diagnosis of Facial droop was also pertinent to this visit. Past Medical History:  has a past medical history of Asthma, Back pain, Cerebral artery occlusion with cerebral infarction ([facility] [address] Diabetes mellitus ([facility] [address] Hyperlipidemia, Hypertension, and Spinal cord stimulator status. Past Surgical History:  has a past surgical history that includes Pancreas surgery; Tubal ligation; Endoscopy, colon, diagnostic; other surgical history (2017); pr insj/rplcmt spi npgr dir/induxive coupling (N/A, 2018); Rotator cuff repair; Upper gastrointestinal endoscopy (N/A, 2022); and Upper gastrointestinal endoscopy (N/A, 2022). Assessment   Body Structures, Functions, Activity Limitations Requiring Skilled Therapeutic Intervention: Decreased balance  Assessment: Patient is 58 y/o female presenting to [facility] secondary to acute CVA. Patient reports she is currently at baseline with all functional mobility at this time. Patient's PLOF includes being independent to mod I with all functional mobility and ADLs with use of SPC. Patient currently requires supervision with ambulation due to gait impairments noted from prior stroke for safety, no LOB. Due to patient presenting at baseline with patient reporting intermittent family support as needed, recommend patient d/c to home setting without PT services. Therapy Prognosis: Good  Decision Making: Low Complexity  No Skilled PT:  At baseline function  Requires PT Follow-Up: No  Activity Tolerance  Activity Tolerance: Patient tolerated evaluation without incident Plan   Physcial Therapy Plan  General Plan: Discharge with evaluation only  Safety Devices  Type of Devices: Call light within reach, Patient at risk for falls, Left in chair, Nurse notified  Restraints  Restraints Initially in Place: No     Restrictions  Restrictions/Precautions  Restrictions/Precautions: Fall Risk  Required Braces or Orthoses?: No     Subjective   General  Chart Reviewed: Yes  Patient assessed for rehabilitation services?: Yes  Additional Pertinent Hx: 59 y.o. female who presents to the emergency department with symptoms. Patient presents with facial droop and slurring of speech. Started at 7 PM.  History of stroke. States it feels similar. No chest pain or shortness of breath. No abdominal pain. Happened many times in the past.  Symptoms are improving on arrival.  States the facial droop is gone now. Just endorses mild slurring of speech. No other associated symptoms. Response To Previous Treatment: Not applicable  Family / Caregiver Present: No  Referring Practitioner: Grace Downing MD  Referral Date : 03/12/23  Diagnosis: Acute CVA  Follows Commands: Within Functional Limits  General Comment  Comments: Patient lying supine in bed upon arrival. Patient is agreeable to PT.          Social/Functional History  Social/Functional History  Lives With: Alone (Dog, Thee)  Type of Home: Apartment  Home Layout: One level, Laundry in basement (Basement in lower level ~14 steps)  Home Access: Stairs to enter with rails  Entrance Stairs - Number of Steps: 7  Entrance Stairs - Rails: Left  Bathroom Shower/Tub: Tub/Shower unit, Shower chair without back  Bathroom Toilet: Standard  Bathroom Equipment: Grab bars in shower, Shower chair  Bathroom Accessibility: Walker accessible  Home Equipment: Noreen Lanius, standard, Rollator  Has the patient had two or more falls in the past year or any fall with injury in the past year?: No  Receives Help From: Family  ADL Assistance: [address] [facility] Assistance: Independent  Homemaking Responsibilities: Yes  Ambulation Assistance: Needs assistance (Use of SPC)  Transfer Assistance: Independent  Active : Yes  Mode of Transportation: Car  Occupation: On disability  Leisure & Hobbies: Bingo  [address]: Impaired  Vision Exceptions: Wears glasses at all times  Hearing  Hearing: Within functional limits    Cognition   Orientation  Overall Orientation Status: Within Functional Limits  Orientation Level: Oriented X4  Cognition  Overall Cognitive Status: WFL     Objective   Bed mobility  Supine to Sit: Independent  Scooting: Independent  Bed Mobility Comments: Patient completed bed mobility with bed flat  Transfers  Sit to Stand: Modified independent  Stand to Sit: Modified independent  Comment: Patient completed STS from EOB and toilet with use of SPC and grab bar with toilet transfer  Ambulation  Surface: Level tile  Device: Single point cane  Assistance: Supervision  Quality of Gait: Decreased step height/length L>R, NBOS, decreased ladonna, increased M/L sway  Distance: 15' + 20'  Comments: Patient ambulated within room, no signs of LOB, gait deviations noted from past CVA, patient reports this is her baseline     Balance  Posture: Fair  Sitting - Static: Good  Sitting - Dynamic: Good  Standing - Static: Good;-  Standing - Dynamic: Fair;+         AM-PAC Score  AM-PAC Inpatient Mobility Raw Score : 20 (03/13/23 0807)  AM-PAC Inpatient T-Scale Score : 47.67 (03/13/23 0807)  Mobility Inpatient CMS 0-100% Score: 35.83 (03/13/23 0807)  Mobility Inpatient CMS G-Code Modifier : CJ (03/13/23 3626)        Goals  Short Term Goals  Time Frame for Short Term Goals: Eval and d/c  Patient Goals   Patient Goals : Return home       Education  Patient Education  Education Given To: Patient  Education Provided: Role of Therapy;Plan of Care;Transfer Training  Education Method: Demonstration;Verbal  Barriers to Learning: None  Education Outcome: Verbalized understanding;Demonstrated understanding      Therapy Time   Individual Concurrent Group Co-treatment   Time In [address] [place] [address]         Time Out 0806         Minutes 27         Timed Code Treatment Minutes: [address] [place], PT   Manolo PT, DPT, [id number]

## 2023-03-13 NOTE — PROGRESS NOTES
MRI results read to on call [facility]. Reviewed plan of care. No new orders at this time.  Neurology to see pt again in the AM.

## 2023-03-13 NOTE — CARE COORDINATION
Case Management Assessment  Initial Evaluation    Date/Time of Evaluation: 3/13/2023 12:32 PM  Assessment Completed by: STEPHANIE Cortez    If patient is discharged prior to next notation, then this note serves as note for discharge by case management. Patient Name: Joellen Fernando                   YOB: 1965  Diagnosis: Acute cerebrovascular accident (CVA) [facility]) [I63.9]                   Date / Time: 3/11/2023 10:51 PM    Patient Admission Status: Inpatient   Readmission Risk (Low < 19, Mod (19-27), High > 27): Readmission Risk Score: 11.6    Current PCP: Izabel Garrison MD  PCP verified by CM? Yes    Chart Reviewed: Yes      History Provided by: Patient  Patient Orientation: Alert and Oriented    Patient Cognition: Alert    Hospitalization in the last 30 days (Readmission):  No    If yes, Readmission Assessment in CM Navigator will be completed. Advance Directives:      Code Status: Full Code   Patient's Primary Decision Maker is: Patient Declined (Legal Next of Kin Remains as Decision Maker)      Discharge Planning:    Patient lives with: Alone (with one dog (Family assisting with caring for the dog while tylor is here in the hospital.)) Type of Home: Apartment  Primary Care Giver: Self  Patient Support Systems include: Family Members, Children   Current Financial resources: Medicaid  Current community resources: None  Current services prior to admission: None            Current DME:              Type of Home Care services:  None    ADLS  Prior functional level: Independent in ADLs/IADLs  Current functional level: Independent in ADLs/IADLs    PT AM-PAC: 20 /24  OT AM-PAC:   /24    Family can provide assistance at DC: Yes  Would you like Case Management to discuss the discharge plan with any other family members/significant others, and if so, who?  No  Plans to Return to Present Housing: Yes  Other Identified Issues/Barriers to RETURNING to current housing: none   Potential Assistance needed at discharge: N/A            Potential DME:    Patient expects to discharge to: [address] [place] [address] for transportation at discharge: Self    Financial    Payor: Sridhar Ventura [employer]irt / Plan: [address] / Product Type: *No Product type* /     Does insurance require precert for SNF: Yes    Potential assistance Purchasing Medications: No  Meds-to-Beds request: Yes      [address], [address] [phone] [place] [phone]  [address] [address]  Phone: [phone] Fax: [address] [place] [address] [phone] - F [phone]  [place] [address]  Phone: [phone] Fax: [phone]      Notes:    Factors facilitating achievement of predicted outcomes: Family support, Friend support, Motivated, Cooperative, Pleasant, Sense of humor, and Good insight into deficits    Barriers to discharge: none identified     Additional Case Management Notes: patient plans to return home alone with family support as needed. PT/OT has evaluated patient and recommends no discharge needs. Patient is independent in room and reports no needs. The Plan for Transition of Care is related to the following treatment goals of Acute cerebrovascular accident (CVA) ([facility] [address] [U17.7]    IF APPLICABLE: The Patient and/or patient representative Jesus Angeles and her family were provided with a choice of provider and agrees with the discharge plan. Freedom of choice list with basic dialogue that supports the patient's individualized plan of care/goals and shares the quality data associated with the providers was provided to:     Patient Representative Name:       The Patient and/or Patient Representative Agree with the Discharge Plan?       STEPHANIE Hays, LSW, Social Work/Case Management   [phone]  Electronically signed by STEPHANIE Hays on 3/13/2023 at 12:32 PM

## 2023-03-13 NOTE — PROGRESS NOTES
Department of Internal Medicine  General Internal Medicine  Attending Progress Note      SUBJECTIVE:  pt is doing fair, events noted yesterdy, she is better today, Appreciated neuro consult. OBJECTIVE      Medications    Current Facility-Administered Medications: albuterol sulfate HFA (PROVENTIL;VENTOLIN;PROAIR) 108 (90 Base) MCG/ACT inhaler 2 puff, 2 puff, Inhalation, Q4H PRN  amLODIPine (NORVASC) tablet 10 mg, 10 mg, Oral, Daily  aspirin chewable tablet 81 mg, 81 mg, Oral, Daily  atorvastatin (LIPITOR) tablet 80 mg, 80 mg, Oral, Nightly  ipratropium-albuterol (DUONEB) nebulizer solution 3 mL, 1 vial, Inhalation, Q4H PRN  metoprolol tartrate (LOPRESSOR) tablet 50 mg, 50 mg, Oral, BID  metFORMIN (GLUCOPHAGE) tablet 500 mg, 500 mg, Oral, BID WC  amitriptyline (ELAVIL) tablet 50 mg, 50 mg, Oral, Nightly  amitriptyline (ELAVIL) tablet 25 mg, 25 mg, Oral, Daily  acetaminophen (TYLENOL) tablet 650 mg, 650 mg, Oral, Q4H PRN  perflutren lipid microspheres (DEFINITY) injection 1.5 mL, 1.5 mL, IntraVENous, ONCE PRN  traMADol (ULTRAM) tablet 50 mg, 50 mg, Oral, Q8H PRN  Physical    VITALS:  /76   Pulse 60   Temp 98.6 °F (37 °C) (Oral)   Resp 18   Ht 5' 5\" (1.651 m)   Wt 185 lb 13.6 oz (84.3 kg)   SpO2 99%   BMI 30.93 kg/m²   CONSTITUTIONAL:  awake, alert, cooperative, no apparent distress, and appears stated age  LUNGS:  No increased work of breathing, good air exchange, clear to auscultation bilaterally, no crackles or wheezing  CARDIOVASCULAR:  Normal apical impulse, regular rate and rhythm, normal S1 and S2, no S3 or S4, and no murmur noted  ABDOMEN:  No scars, normal bowel sounds, soft, non-distended, non-tender, no masses palpated, no hepatosplenomegally  NEUROLOGIC:  mild L sided facial droop.  Coherent, mild HA on and off,  Data    CBC:   Lab Results   Component Value Date/Time    WBC 8.0 03/11/2023 11:10 PM    RBC 3.78 03/11/2023 11:10 PM    HGB 10.7 03/11/2023 11:10 PM    HCT 33.7 03/11/2023 11:10 PM MCV 89.2 03/11/2023 11:10 PM    MCH 28.2 03/11/2023 11:10 PM    MCHC 31.6 03/11/2023 11:10 PM    RDW 16.5 03/11/2023 11:10 PM     03/11/2023 11:10 PM    MPV 8.5 03/11/2023 11:10 PM     BMP:    Lab Results   Component Value Date/Time     03/11/2023 11:10 PM    K 3.6 03/11/2023 11:10 PM     03/11/2023 11:10 PM    CO2 19 03/11/2023 11:10 PM    BUN 19 03/11/2023 11:10 PM    LABALBU 4.2 03/11/2023 11:10 PM    CREATININE 1.5 03/11/2023 11:10 PM    CALCIUM 9.5 03/11/2023 11:10 PM    GFRAA >60 04/12/2022 11:31 AM    LABGLOM 40 03/11/2023 11:10 PM    GLUCOSE 197 03/11/2023 11:10 PM     FINDINGS:   BRAIN/VENTRICLES: There is no acute intracranial hemorrhage, mass effect or   midline shift. No abnormal extra-axial fluid collection. The gray-white   differentiation is maintained without evidence of an acute infarct. There is   prominence of the ventricles and sulci due to global parenchymal volume loss. There are nonspecific areas of hypoattenuation within the periventricular and   subcortical white matter, which likely represent chronic microvascular   ischemic change. Small old stroke in the right parietal lobe. Focal area of   patchy hypoattenuation in the right frontal insular region. ORBITS: The visualized portion of the orbits demonstrate no acute abnormality. SINUSES: Right maxillary sinus mucosal thickening. The bilateral mastoid air   cells are clear. SOFT TISSUES/SKULL: No acute abnormality of the visualized skull or soft   tissues. Impression   1. No acute intracranial hemorrhage or mass effect. 2.  Chronic small vessel ischemic disease. 3.  Small old stroke in the right parietal lobe. 4.  Focal area of patchy hypoattenuation in the right frontal insular region   could represent acute/subacute versus old stroke. Follow-up brain MRI may be   helpful for further evaluation.      CTA NECK:       AORTIC ARCH/ARCH VESSELS: No dissection or arterial injury. No significant   stenosis of the brachiocephalic or subclavian arteries. Atherosclerosis in   the visualized thoracic aorta. CAROTID ARTERIES: No dissection, arterial injury, or hemodynamically   significant stenosis by NASCET criteria. VERTEBRAL ARTERIES: No dissection, arterial injury, or significant stenosis. SOFT TISSUES: The lung apices are clear. No cervical or superior mediastinal   lymphadenopathy. The larynx and pharynx are unremarkable. No acute   abnormality of the salivary and thyroid glands. BONES: No acute osseous abnormality. Multilevel degenerative changes in the   visualized spine. CTA HEAD:       ANTERIOR CIRCULATION: Calcifications in the bilateral intracranial internal   carotid arteries without hemodynamically significant stenosis. Focal severe   stenosis/near occlusion of a right middle cerebral artery M2 branch. The   right middle cerebral artery is otherwise patent. Focal severe stenosis/near   occlusion of the left middle cerebral artery at the M1 bifurcation. The left   middle cerebral artery is otherwise patent. No focal high-grade stenosis or   occlusion in the proximal bilateral anterior cerebral arteries. No aneurysm. POSTERIOR CIRCULATION: No significant stenosis of the vertebral, basilar, or   posterior cerebral arteries. No aneurysm. OTHER: No dural venous sinus thrombosis on this non-dedicated study. BRAIN: No mass effect or midline shift. No extra-axial fluid collection. The   gray-white differentiation is maintained. Impression   1. Focal severe stenosis/near occlusion of the left middle cerebral artery   M1 bifurcation. 2.  Focal severe stenosis/near occlusion of a right middle cerebral artery M2   branch. 2.  No hemodynamically significant stenosis in the bilateral cervical   internal carotid arteries per NASCET criteria. This scan was analyzed using Viz. ai contact LVO. Identification of suspected   findings is not for diagnostic use beyond notification. Viz LVO is limited to   analysis of imaging data and should not be used in-lieu of full patient   evaluation or relied upon to make or confirm diagnosis.            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

## 2023-03-13 NOTE — CONSULTS
[address] [address]                                  CONSULTATION    PATIENT NAME: Evelin Johns                    :        1965  MED REC NO:   [id number]                          ROOM:       4221  ACCOUNT NO:   [de-identified]                           ADMIT DATE: 2023  PROVIDER:     Michael Shaw    CONSULT DATE:  2023    REFERRING PROVIDER:  Izabel Garrison MD    REASON FOR CONSULTATION:  CVA, slurred speech and left-sided weakness. HISTORY OF PRESENT ILLNESS:  The patient is a 68-year-old female seen in  neurologic consultation at your request secondary to the above. The  patient reports a history of left-sided weakness from prior stroke. However, she reports that her symptoms started again last night. She  states that she noticed when she was trying to drink some juice that it  came out of her mouth. She knows that her speech was slurred at that  time. She did go to the emergency room and it was felt that she did not  qualify for TNK. There was some changes noted on CT with his  potential ischemia of undetermined timing. Again, she does admit having  a prior history of stroke with similar symptoms in the past.  She states  that the left side seems to be back close to her baseline though  possibly not quite always back to her previous baseline. She is able to  swallow better now. She did have some left arm weakness which has  improved though she feels that it may still be just a little weaker than  usual.  She denies any other specific complaints currently. PAST MEDICAL HISTORY:  She has a prior history of asthma, back pain,  diabetes mellitus, hyperlipidemia, hypertension, prior stroke. PAST SURGICAL HISTORY:  She has had a prior spinal cord stimulator,  prior pancreas surgery, rotator cuff repair, tubal ligation.     FAMILY HISTORY:  She denies any reported neurologic disease or disorders  in her family. SOCIAL HISTORY:  She does smoke half a pack of cigarettes per day. She  denies any alcohol use though she does admit using marijuana and  opiates. MEDICATIONS:  Her outpatient medications listed include amitriptyline,  GlycoLax, calcium with vitamin D, tramadol, Lopressor, DuoNeb, aspirin,  Norvasc, Glucophage, Lipitor and albuterol. ALLERGIES:  She has no known drug allergies. REVIEW OF SYSTEMS:  As above and per chart which has been reviewed by  me. All other remaining review of systems is negative on examination. PHYSICAL EXAMINATION:  VITAL SIGNS:  Temperature 98 degrees, respiratory rate 17, pulse 78,  blood pressure 136/83. HEENT:  Her head is normocephalic and atraumatic. There are no Martinez  signs or raccoon eyes noted. NECK:  Supple without nuchal rigidity. EXTREMITIES:  Without significant cyanosis, clubbing or edema. Peripheral pulses are intact bilaterally. NEUROLOGIC EXAMINATION:      Higher cortical function/mental status. She is currently  awake, alert and oriented to person, place and time. Recent and remote  memory appear to be relatively intact. Speech is fluent with some mild  dysarthria. CRANIAL NERVE EXAMINATION:  The fundi were not visualized. Pupils are  equal, round, reactive to light. Extraocular muscles are full  bilaterally without nystagmus. Facial sensation is intact bilaterally. Facial muscle movements are relatively intact with a slightly decreased  left nasolabial fold, which does innervate fairly well. There is a lag,  however. Hearing is intact to conversation. Soft palate elevates  symmetrically. Tongue protrudes in the midline. MOTOR EXAMINATION:  She moves all four extremities to command. There is  minimal weakness in the left upper extremity as compared to the right. There also appears to be some slight weakness in the left lower  extremity as compared to the right.   Tone appears to be normal and  symmetric bilaterally. No tremors present. REFLEXES:  Deep tendon reflexes are 2+/4 bilaterally biceps, triceps,  brachioradialis and patellar regions. Plantar responses are equivocal  on the left and downgoing on the right. SENSORY EXAMINATION:  Sensation to pinprick is intact in all four  extremities. COORDINATION:  Finger-to-nose is intact bilaterally. There is no  dysdiadochokinesis or dysmetria identified. GAIT AND STATION:  Her gait was not assessed due to potential for fall  risk. LABORATORY DATA:  Sodium 138, potassium 3.6, BUN 19, creatinine 1.5, GFR  40. ALT 11, AST 17, glucose 197. White blood cell count 8.0.    RADIOLOGIC STUDIES:  CT scan of the head was reviewed including the  images personally myself. There was no obvious acute hemorrhage or  mass. However, there was an area of hypoattenuation in the right  frontal insular region. This was felt to possibly represent acute or  subacute ischemia versus an old infarct. An MRI was recommended. There  was also chronic small vessel ischemic changes along with an old stroke  in the right parietal region. IMPRESSION:  3  61-year-old female with left-sided weakness, dysarthria, and left  face weakness. Most of the symptoms have either resolved back to  previous baseline or minimal at this point. It is unclear whether this  is a new stroke, TIA, or record as since of her old stroke symptoms due  to other potential etiology. 2.  History of stroke. 3.  Numerous stroke risk factors, including diabetes mellitus,  hypertension, hyperlipidemia, prior stroke, and smoking. PLAN:  1. MRI of the brain. 2.  Echocardiogram.  I have ordered both of these. 3.  Continue antiplatelet agent for secondary stroke prevention. Consider adding Plavix if there is evidence of new infarct. 4.  Continue statin agent for secondary stroke prevention with a goal  LDL of less than 70.  5.  Permissive hypertension for 24 hours.   Recommend avoiding  hypotension. 6.  Continue telemetry to assess for atrial fibrillation. 7.  Recommend attempts at normoglycemia when possible. 8.  Rehab efforts as needed. 9.  Smoking cessation. 10.  Above discussed with the patient as well as nursing staff. Thank you for allowing me to participate in the care of this patient  with high medical complexity requiring a high level medical decision  making. If I can be of any further assistance, please do not hesitate  to contact me.         Bladimir Cadena DO    D: 03/12/2023 16:35:31       T: 03/12/2023 16:39:44     [email or web address]  Job#: [id number]     Doc#: [id number]    CC:

## 2023-03-13 NOTE — PROGRESS NOTES
Occupational Therapy  Orders received. Chart reviewed. Pt reports functioning at baseline at this time. Pt reports having needed AD and support from family. Will sign off at this time.      Angela Saldivar OTR/L

## 2023-03-14 VITALS
HEIGHT: 65 IN | BODY MASS INDEX: 30.67 KG/M2 | HEART RATE: 67 BPM | OXYGEN SATURATION: 100 % | TEMPERATURE: 98 F | SYSTOLIC BLOOD PRESSURE: 141 MMHG | WEIGHT: 184.08 LBS | RESPIRATION RATE: 21 BRPM | DIASTOLIC BLOOD PRESSURE: 92 MMHG

## 2023-03-14 LAB
GLUCOSE BLD-MCNC: 126 MG/DL (ref 70–99)
GLUCOSE BLD-MCNC: 154 MG/DL (ref 70–99)
PERFORMED ON: ABNORMAL
PERFORMED ON: ABNORMAL

## 2023-03-14 PROCEDURE — 6370000000 HC RX 637 (ALT 250 FOR IP): Performed by: SPECIALIST

## 2023-03-14 PROCEDURE — 94760 N-INVAS EAR/PLS OXIMETRY 1: CPT

## 2023-03-14 RX ORDER — ASPIRIN 325 MG
325 TABLET ORAL DAILY
Qty: 30 TABLET | Refills: 3 | Status: SHIPPED | OUTPATIENT
Start: 2023-03-14

## 2023-03-14 RX ORDER — PANTOPRAZOLE SODIUM 40 MG/1
40 TABLET, DELAYED RELEASE ORAL
Qty: 90 TABLET | Refills: 1 | Status: SHIPPED | OUTPATIENT
Start: 2023-03-14

## 2023-03-14 RX ORDER — CLOPIDOGREL BISULFATE 75 MG/1
75 TABLET ORAL DAILY
Qty: 90 TABLET | Refills: 1 | Status: SHIPPED | OUTPATIENT
Start: 2023-03-14

## 2023-03-14 RX ADMIN — METFORMIN HYDROCHLORIDE 500 MG: 500 TABLET ORAL at 09:51

## 2023-03-14 RX ADMIN — METOPROLOL TARTRATE 50 MG: 50 TABLET ORAL at 09:51

## 2023-03-14 RX ADMIN — AMLODIPINE BESYLATE 10 MG: 10 TABLET ORAL at 09:51

## 2023-03-14 RX ADMIN — ASPIRIN 81 MG CHEWABLE TABLET 81 MG: 81 TABLET CHEWABLE at 09:51

## 2023-03-14 RX ADMIN — AMITRIPTYLINE HYDROCHLORIDE 25 MG: 25 TABLET, FILM COATED ORAL at 09:51

## 2023-03-14 ASSESSMENT — PAIN SCALES - GENERAL
PAINLEVEL_OUTOF10: 0
PAINLEVEL_OUTOF10: 0

## 2023-03-14 NOTE — PROGRESS NOTES
Pt discharged per  Edgar Mora arranging for pt to get event monitor out pt, pt to call and follow up tomorrow, reviewed all discharge instructions and follow ups with pt, pt states that she understands all instructions, iv removed without any complications, pt assisted to car by this nurse without any complications.

## 2023-03-14 NOTE — PLAN OF CARE
Problem: Discharge Planning  Goal: Discharge to home or other facility with appropriate resources  3/14/2023 1211 by Grace Berg RN  Outcome: Progressing  3/14/2023 0212 by Pablo Gunderson RN  Outcome: Progressing     Problem: Pain  Goal: Verbalizes/displays adequate comfort level or baseline comfort level  3/14/2023 1211 by Grace Berg RN  Outcome: Progressing  3/14/2023 0212 by Pablo Gunderson RN  Outcome: Progressing     Problem: Safety - Adult  Goal: Free from fall injury  3/14/2023 1211 by Grace Berg RN  Outcome: Progressing  3/14/2023 0212 by Pablo Gunderson RN  Outcome: Progressing     Problem: Neurosensory - Adult  Goal: Achieves stable or improved neurological status  3/14/2023 1211 by Grace Berg RN  Outcome: Progressing  3/14/2023 0212 by Pablo Gunderson RN  Outcome: Progressing  Goal: Achieves maximal functionality and self care  3/14/2023 1211 by Grace Berg RN  Outcome: Progressing  3/14/2023 0212 by Pablo Gunderson RN  Outcome: Progressing     Problem: ABCDS Injury Assessment  Goal: Absence of physical injury  Outcome: Progressing

## 2023-03-14 NOTE — PROGRESS NOTES
NAME:  Jose Jean  YOB: 1965  MEDICAL RECORD NUMBER:  [id number]  TODAYS DATE:  3/14/2023    Discussed personal risk factors for Stroke/TIA with patient/family, and ways to reduce the risk for a recurrent stroke. Patient's personal risk factors which were identified are:     [] Alcohol Abuse: check with your physician before any alcohol consumption. [] Atrial fibrillation: may cause blood clots. [] Drug Abuse: Seek help, talk with your doctor  [] Clotting Disorder  [x] Diabetes  [x] Family history of stroke or heart disease  [x] High Blood Pressure/Hypertension: work with your physician. [x] High cholesterol: monitor cholesterol levels with your physician.   [] Overweight/Obesity: work with your physician for your ideal body weight.  [] Physical Inactivity: get regular exercise as directed by your physician. [x] Personal history of previous TIA or stroke  [] Poor Diet; decrease salt (sodium) in your diet, follow diet directed by physician. [] Smoking: Cigarette/Cigar: stop smoking. Advised pt. that you can reduce your risk for stroke/TIA by modifying/controlling the risk factors that you have. Pt.advised to take the medications as prescribed, which will be detailed in the discharge instructions, and to not stop taking them without consulting their physician. In addition, pt. advised to maintain a healthy diet, exercise regularly and to not smoke. [facility]'s Stroke treatment and prevention, Managing your recovery  notebook  provided and/or reviewed  with patient/family. The notebook includes, but not limited to, sections addressing warning signs & symptoms of a stroke, which are: sudden numbness or weakness especially on one side of the body, sudden confusion, difficulty speaking or understanding, sudden changes in vision, sudden dizziness or loss of balance/ coordination, sudden severe headache, syncope and seizure.   The need to call EMS (911) immediately if signs & symptoms occur is emphasized . The notebook also provides education on Stroke community resources and stroke advocacy. The need for follow-up after discharge was highlighted with patient/family with them being able to repeat understanding of the importance of this.       Electronically signed by Rufina Segovia RN on 3/14/2023 at 5:22 PM

## 2023-03-14 NOTE — CARE COORDINATION
Case Management Discharge Note          Date / Time of Note: 3/14/2023 3:21 PM                  Patient Name: Caitie Carlisle   YOB: 1965  Diagnosis: Acute cerebrovascular accident (CVA) [facility]) [I63.9]   Date / Time: 3/11/2023 10:51 PM    Financial:  Payor: Erma Pitts / Plan: Veverly Canary PLUS HMO / Product Type: *No Product type* /      Pharmacy:  [address] [place] [address] pharmacy #[phone]    Assistance purchasing medications?: Potential Assistance Purchasing Medications: No  Assistance provided by Case Management: None at this time    DISCHARGE Disposition: Home- No Services Needed    Referrals made at [facility] for outpatient continued care:  [facility]    Transportation:  Transportation PLAN for discharge: family   Mode of Transport: Private Car  Time of Transport: patient to coordinate with RN     Transport form completed: Not Indicated    IMM Completed:   Not Indicated    Additional CM Notes: Patient plans to return home alone with family support. Patient denied additional needs.      STEPHANIE Farias  [address] [address]   Case Management Department  Ph: [phone]  Electronically signed by STEPHANIE Farias on 3/14/2023 at 3:22 PM Office Visit    Assessment and Plan      1. Acute sinusitis, recurrence not specified, unspecified location    2. Viral upper respiratory tract infection with cough    3. Paresthesia of left arm    4. Pain in both hands    5. Plantar fascia syndrome    Bed rest if needed, drink plenty of fluids, decongestant like Coricidin -  HBP 1 tablet po every 8-12 hours  as needed to relieve sinus pressure.Mucinex 600 mg po bid Delsym 5 ml po 2 x aday  Tylenol 650 mg po every 4-6 hours as needed for pain and fever.   Follow up in 7-10 days, earlier if symptoms not improved or worsened.   Prostatism left arm could be related to shoulder blade strain-heat application avoid aggravated activity gentle stretching and the course of steroids.  Needs to follow in 2-3 weeks better needs to have nerve conduction studies and possible imaging study.  For the hands proper position while working Medrol Dosepak might help as well.  Soaking in warm water.  Have good quality of shoes, avoid flip-flops, soft inserters like to Dr. valencia for the shoes.  Stretching exercise .  Orders Placed This Encounter   • ipratropium (ATROVENT) 0.03 % nasal spray   • methylPREDNISolone (MEDROL DOSEPAK) 4 MG tablet     Return in about 3 weeks (around 3/12/2020).      CHIEF COMPLAINT    Chief Complaint   Patient presents with   • Nausea     on and off for a few days   • Cough     x 4 days    • Throat Problem     x 4 days    • Arm     couple months numbness/ left arm    • Foot     couple months numbness          History of present illness    She is here today for few problems and having the cough for few days illness started prior to that she developed sore throat body ache low-grade fever while she was in [place] her  had the same illness.  The cough is nonproductive associated with the tickling in her throat postnasal drainage and mild nausea on and off, which could be related to drainage. The drainage is not purulent  Has no fever problem breathing or  wheezing.  No dizziness or lightheadedness.  Does not take any medication.  2. Has an intermittent numbness sensation of the left arm on and off if she change position or she lean on her shoulder.  Associated with the mild to moderate in intensity dull in character in the shoulder blade for few months.  Denies any injury or fall, has no neck pain.  Has no weakness of the arm.  3. Pain in 4th and 5th fingers of the left hand associated with some pain in the both hands and stiffness.  She works whole day in the computer typing.  No injury.  No swelling or deformities of the joints.  4. Has been having a pain on  meta phalangeal area of the both feet while walking.  The symptoms for few months.  Has no deformity or injury.  Denies swelling numbness or tingling  I have reviewed the allergies, medication list, and past medical, family, and social history sections listed in the above medical record as well as any pertinent nursing notes.     I have reviewed pertinent recent labs.    REVIEW OF SYSTEMS:  Constitutional: No fevers or chills. No fatigue. No abnormal weight gain or loss.  Respiratory: No shortness of breath mild nonproductive cough. No wheezing.  Cardiovascular: No chest pain. No palpitations. No edema. No syncope.  Gastrointestinal: No abdominal pain. + nausea. No vomiting. No diarrhea. No constipation. No blood in stool.    All other review of systems negative, except for those noted.     Physical ExamINATION    Vital Signs:    Vitals:    02/20/20 1239   BP: 100/72   Pulse: 86   Temp: 98.6 °F (37 °C)   TempSrc: Tympanic   SpO2: 97%   Weight: 79.2 kg   Height: 5' 1\" (1.549 m)   Body mass index is 32.99 kg/m².   General:  Well-developed, well-nourished.  In no apparent distress.  Eyes:  PERRL, EOMI (Pupils equal, round, reactive to light, extraocular movements intact).  Conjunctivae pink.  Sclerae anicteric.    HENT:  Normocephalic, atraumatic. Bilateral external ears are normal. Mucosal membranes moist.  External nose is normal.  Postnasal drainage white color+   Respiratory:  Normal respiratory effort. No chest wall tenderness. Lungs clear to auscultation bilaterally. Symmetrical chest expansion.  Cardiovascular:  Regular rate and rhythm. No murmurs, no rubs, no gallops. Normal S1 and S2. No S3 or S4. No JVD (jugular venous distention). No carotid bruits. No peripheral edema.   Musculoskeletal examination showed no deformities or  synovitis the hands or feet.  Normal muscle strength of both hands and feet.  Normal range of motion of the joints including shoulders elbows,wrists and ankles.  There is a tenderness to palpation at interscapular on the left up to level of the neck, but no tenderness or pain on palpation of the neck normal range of motion of the neck.  No atrophy of the muscles of the neck, shoulder blades, arms or legs.  Examination of the feet reveal no deformities normal pulses, no sensory loss to filament test  Neurologic examination nonfocal, no sensory loss soft touch sensation  Past Medical History:   Diagnosis Date   • Amenorrhea    • Menorrhagia with irregular cycle    • Sleep apnea     no cpap   • Thyroid condition

## 2023-03-14 NOTE — CONSULTS
The patient was not seen. A consult order was placed to obtain an outpatient cardiac event monitor which does not require a consultation. Will arrange for an outpatient cardiac event monitor. Please call with questions or if the PCP is requesting a formal consultation for a different reason.

## 2023-03-14 NOTE — PROGRESS NOTES
Department of Internal Medicine  General Internal Medicine  Attending Progress Note      SUBJECTIVE:  pt has no new sx,still has L facial droop mild one, and fluctuating slurred speech.  Afebrile,    OBJECTIVE      Medications    Current Facility-Administered Medications: LORazepam (ATIVAN) injection 0.5 mg, 0.5 mg, IntraVENous, Once  albuterol sulfate HFA (PROVENTIL;VENTOLIN;PROAIR) 108 (90 Base) MCG/ACT inhaler 2 puff, 2 puff, Inhalation, Q4H PRN  amLODIPine (NORVASC) tablet 10 mg, 10 mg, Oral, Daily  aspirin chewable tablet 81 mg, 81 mg, Oral, Daily  atorvastatin (LIPITOR) tablet 80 mg, 80 mg, Oral, Nightly  ipratropium-albuterol (DUONEB) nebulizer solution 3 mL, 1 vial, Inhalation, Q4H PRN  metoprolol tartrate (LOPRESSOR) tablet 50 mg, 50 mg, Oral, BID  metFORMIN (GLUCOPHAGE) tablet 500 mg, 500 mg, Oral, BID WC  amitriptyline (ELAVIL) tablet 50 mg, 50 mg, Oral, Nightly  amitriptyline (ELAVIL) tablet 25 mg, 25 mg, Oral, Daily  acetaminophen (TYLENOL) tablet 650 mg, 650 mg, Oral, Q4H PRN  perflutren lipid microspheres (DEFINITY) injection 1.5 mL, 1.5 mL, IntraVENous, ONCE PRN  traMADol (ULTRAM) tablet 50 mg, 50 mg, Oral, Q8H PRN  Physical    VITALS:  BP (!) 145/78   Pulse 58   Temp 98 °F (36.7 °C) (Oral)   Resp 17   Ht 5' 5\" (1.651 m)   Wt 184 lb 1.4 oz (83.5 kg)   SpO2 97%   BMI 30.63 kg/m²   CONSTITUTIONAL:  awake, alert, cooperative, no apparent distress, and appears stated age  LUNGS:  No increased work of breathing, good air exchange, clear to auscultation bilaterally, no crackles or wheezing  CARDIOVASCULAR:  Normal apical impulse, regular rate and rhythm, normal S1 and S2, no S3 or S4, and no murmur noted  ABDOMEN:  No scars, normal bowel sounds, soft, non-distended, non-tender, no masses palpated, no hepatosplenomegally  NEUROLOGIC:  L facial droop mild one,  Data    CBC:   Lab Results   Component Value Date/Time    WBC 8.0 03/11/2023 11:10 PM    RBC 3.78 03/11/2023 11:10 PM    HGB 10.7 03/11/2023 11:10 PM    HCT 33.7 03/11/2023 11:10 PM    MCV 89.2 03/11/2023 11:10 PM    MCH 28.2 03/11/2023 11:10 PM    MCHC 31.6 03/11/2023 11:10 PM    RDW 16.5 03/11/2023 11:10 PM     03/11/2023 11:10 PM    MPV 8.5 03/11/2023 11:10 PM     BMP:    Lab Results   Component Value Date/Time     03/11/2023 11:10 PM    K 3.6 03/11/2023 11:10 PM     03/11/2023 11:10 PM    CO2 19 03/11/2023 11:10 PM    BUN 19 03/11/2023 11:10 PM    LABALBU 4.2 03/11/2023 11:10 PM    CREATININE 1.5 03/11/2023 11:10 PM    CALCIUM 9.5 03/11/2023 11:10 PM    GFRAA >60 04/12/2022 11:31 AM    LABGLOM 40 03/11/2023 11:10 PM    GLUCOSE 197 03/11/2023 11:10 PM     FINDINGS:   INTRACRANIAL STRUCTURES/VENTRICLES: Volume loss is noted with mild chronic   white matter microvascular ischemic change. Remote infarcts are present   within the deep white matter of both cerebral hemispheres and within the   right parietal lobe. There are also chronic microinfarcts within the   cerebellum. Acute lacunar infarcts are noted in the right frontal lobe, both parietal   lobes, and the left occipital lobe. No acute intracranial mass, shift, or bleed is identified. Normal expected   signal voids are seen within the vessels at the base of skull. ORBITS: The visualized portion of the orbits demonstrate no acute abnormality. SINUSES: Retention cysts are seen in the right maxillary sinus. BONES/SOFT TISSUES: The bone marrow signal intensity appears normal. The soft   tissues demonstrate no acute abnormality. Impression   Small acute infarcts within both cerebral hemispheres. The findings were sent to the Radiology Results [address] at 6:56   pm on 3/13/2023 to be communicated to a licensed caregiver. Conclusions      Summary   Normal left ventricle size, wall thickness, and systolic function with an   estimated ejection fraction of 55-60%. No regional wall motion abnormalities   are seen.    The right ventricle is normal in size and function. Moderate mitral regurgitation. Trivial tricuspid regurgitation. Estimated pulmonary artery systolic pressure is normal at 30 mmHg assuming a   right atrial pressure of 3 mmHg. A bubble study was performed and fails to show evidence of right to left   shunting. 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 HTN controlled with med,s  DM-2,cont med,   Smoking, she is trying to quit,  Pt would like to be d/c home when OK with neuro.   Dr Paul Morales

## 2023-03-14 NOTE — DISCHARGE INSTR - COC
Continuity of Care Form    Patient Name: Gladis Bowden   :  1965  MRN:  [id number]    Admit date:  3/11/2023  Discharge date:  ***    Code Status Order: Full Code   Advance Directives:     Admitting Physician:  Obed Bardales MD  PCP: Obed Bardales MD    Discharging Nurse: [facility] Unit/Room#: A5F-7401/7102-31  Discharging Unit Phone Number: ***    Emergency Contact:   Extended Emergency Contact Information  Primary Emergency Contact: [address] [place] [address] Phone: [phone]  Relation: Brother/Sister    Past Surgical History:  Past Surgical History:   Procedure Laterality Date    ENDOSCOPY, COLON, DIAGNOSTIC      OTHER SURGICAL HISTORY  2017    EGD with biopsy    PANCREAS SURGERY      OH INSJ/RPLCMT SPI NPGR DIR/INDUXIVE COUPLING N/A 2018    REPLACEMENT / [address] [place] [address] performed by Shavon Hand MD at [address] [facility] ENDOSCOPY N/A 2022    EGD ESOPHAGOGASTRODUODENOSCOPY ULTRASOUND performed by Anish Shirley MD at [address] N/A 2022    EGD BIOPSY performed by Anish Shirley MD at [address] [facility] ENDOSCOPY       Immunization History:   Immunization History   Administered Date(s) Administered    COVID-19, PFIZER PURPLE top, DILUTE for use, (age 15 y+), 30mcg/0.3mL 2021, 2021    Influenza Vaccine, unspecified formulation 2016       Active Problems:  Patient Active Problem List   Diagnosis Code    Pneumonia J18.9    Morbid obesity with BMI of 50.0-59.9, adult ([facility] E66.01, Z68.43    Essential hypertension I10    Hyperlipidemia E78.5    Type 2 diabetes mellitus without complication, with long-term current use of insulin (HCC) E11.9, D59.7    Uncomplicated asthma S96.153    Cigarette nicotine dependence without complication I20.950    Chronic superficial gastritis without bleeding K29.30    Acute cerebrovascular accident (CVA) ([facility] I63.9       Isolation/Infection:   Isolation            No Isolation          Patient Infection Status       None to display            Nurse Assessment:  Last Vital Signs: BP (!) 141/92   Pulse 67   Temp 98 °F (36.7 °C) (Oral)   Resp 21   Ht 5' 5\" (1.651 m)   Wt 184 lb 1.4 oz (83.5 kg)   SpO2 100%   BMI 30.63 kg/m²     Last documented pain score (0-10 scale): Pain Level: 0  Last Weight:   Wt Readings from Last 1 Encounters:   03/14/23 184 lb 1.4 oz (83.5 kg)     Mental Status:  {IP PT MENTAL STATUS:23087}    IV Access:  { DEJON IV ACCESS:800536021}    Nursing Mobility/ADLs:  Walking   {CHP DME USPN:363092481}  Transfer  {CHP DME FAYR:604571148}  Bathing  {CHP DME LSOD:540747104}  Dressing  {CHP DME GVRC:965669834}  Toileting  {P DME GILM:940034353}  Feeding  {P DME HCKD:902615208}  Med Admin  {P DME KMDM:167119153}  Med Delivery   { DEJON MED Delivery:969422861}    Wound Care Documentation and Therapy:  Incision 08/21/18 Back Left; Lower (Active)   Number of days: 1666        Elimination:  Continence: Bowel: {YES / VA:54445}  Bladder: {YES / BC:48411}  Urinary Catheter: {Urinary Catheter:031259867}   Colostomy/Ileostomy/Ileal Conduit: {YES / VB:06645}       Date of Last BM: ***    Intake/Output Summary (Last 24 hours) at 3/14/2023 1637  Last data filed at 3/14/2023 1419  Gross per 24 hour   Intake 900 ml   Output --   Net 900 ml     I/O last 3 completed shifts:   In: 5 [P.O.:540]  Out: -     Safety Concerns:     [address] DEJON Safety Concerns:287263577}    Impairments/Disabilities:      [address] [facility] Impairments/Disabilities:801733215}    Nutrition Therapy:  Current Nutrition Therapy:   [address] DEJON Diet List:571630041}    Routes of Feeding: {CHP DME Other Feedings:443733451}  Liquids: {Slp liquid thickness:50564}  Daily Fluid Restriction: {CHP DME Yes amt example:212985655}  Last Modified Barium Swallow with Video (Video Swallowing Test): {Done Not Done NVTB:056099439}    Treatments at the Time of Hospital Discharge:   Respiratory Treatments: ***  Oxygen Therapy:  {Therapy; copd oxygen:70219}  Ventilator:    {[facility] Vent RVIH:000697208}    Rehab Therapies: {THERAPEUTIC INTERVENTION:7501549906}  Weight Bearing Status/Restrictions: {[facility] Weight Bearin}  Other Medical Equipment (for information only, NOT a DME order):  {EQUIPMENT:480644130}  Other Treatments: ***    Patient's personal belongings (please select all that are sent with patient):  {CHP DME Belongings:721838426}    RN SIGNATURE:  {Esignature:466223460}    CASE MANAGEMENT/SOCIAL WORK SECTION    Inpatient Status Date: ***    Readmission Risk Assessment Score:  Readmission Risk              Risk of Unplanned Readmission:  11           Discharging to Facility/ Agency   Name:   Address:  Phone:  Fax:    Dialysis Facility (if applicable)   Name:  Address:  Dialysis Schedule:  Phone:  Fax:    / signature: {Esignature:185259415}    PHYSICIAN SECTION    Prognosis: Fair    Condition at Discharge: Stable    Rehab Potential (if transferring to Rehab): Fair    Recommended Labs or Other Treatments After Discharge: f/u by PCP in 2 wks, neurology in 1 Mo, 30days event monitor    Physician Certification: I certify the above information and transfer of Carlyn Muñoz  is necessary for the continuing treatment of the diagnosis listed and that she requires {Admit to Appropriate Level of Care:53036} for {GREATER/LESS:870447342} 30 days.      Update Admission H&P: No change in H&P    PHYSICIAN SIGNATURE:  Electronically signed by Nohemi Newell MD on 3/14/23 at 4:37 PM EDT

## 2023-03-14 NOTE — PROGRESS NOTES
Progress Note    Updates  Doing well. Hoping to go home.       Active Ambulatory Problems     Diagnosis Date Noted    Pneumonia 12/06/2014    Morbid obesity with BMI of 50.0-59.9, adult ([facility] 12/21/2016    Essential hypertension 12/21/2016    Hyperlipidemia 12/21/2016    Type 2 diabetes mellitus without complication, with long-term current use of insulin ([facility] 80/25/4524    Uncomplicated asthma     Cigarette nicotine dependence without complication 45/87/9418    Chronic superficial gastritis without bleeding      Past Medical History:   Diagnosis Date    Asthma     Back pain     Cerebral artery occlusion with cerebral infarction ([facility]     Diabetes mellitus ([facility]     Hypertension     Spinal cord stimulator status      Current Facility-Administered Medications:     LORazepam (ATIVAN) injection 0.5 mg, 0.5 mg, IntraVENous, Once, Zulma Berrios MD    albuterol sulfate HFA (PROVENTIL;VENTOLIN;PROAIR) 108 (90 Base) MCG/ACT inhaler 2 puff, 2 puff, Inhalation, Q4H PRN, Zulma Berrios MD    amLODIPine (NORVASC) tablet 10 mg, 10 mg, Oral, Daily, Zulma Berrios MD, 10 mg at 03/13/23 1214    aspirin chewable tablet 81 mg, 81 mg, Oral, Daily, Zulma Berrios MD, 81 mg at 03/13/23 0852    atorvastatin (LIPITOR) tablet 80 mg, 80 mg, Oral, Nightly, Zulma Berrios MD, 80 mg at 03/13/23 2016    ipratropium-albuterol (DUONEB) nebulizer solution 3 mL, 1 vial, Inhalation, Q4H PRN, Zulma Berrios MD    metoprolol tartrate (LOPRESSOR) tablet 50 mg, 50 mg, Oral, BID, Zulma Berrios MD, 50 mg at 03/13/23 2016    metFORMIN (GLUCOPHAGE) tablet 500 mg, 500 mg, Oral, BID WC, Zulma Berrios MD, 500 mg at 03/13/23 6225    amitriptyline (ELAVIL) tablet 50 mg, 50 mg, Oral, Nightly, Zulma Berrios MD, 50 mg at 03/13/23 2016    amitriptyline (ELAVIL) tablet 25 mg, 25 mg, Oral, Daily, Zulma Berrios MD, 25 mg at 03/13/23 5307    acetaminophen (TYLENOL) tablet 650 mg, 650 mg, Oral, Q4H PRN, Zulma Berrios MD, 650 mg at 03/12/23 1312 perflutren lipid microspheres (DEFINITY) injection 1.5 mL, 1.5 mL, IntraVENous, ONCE PRN, Katherine Pham DO    traMADol Luis Blade) tablet 50 mg, 50 mg, Oral, Q8H PRN, Kit Estevez MD, 50 mg at 03/13/23 2007    Current Outpatient Medications on File Prior to Encounter   Medication Sig    amitriptyline (ELAVIL) 25 MG tablet Take 50 mg by mouth nightly Pt takes 25 mg in am and 50 mg at night    polyethylene glycol (GLYCOLAX) 17 g packet Take 17 g by mouth daily as needed for Constipation    Calcium Carbonate-Vitamin D (OYSTER SHELL CALCIUM/D) 500-5 MG-MCG TABS Take 1 tablet by mouth 2 times daily (with meals)    traMADol (ULTRAM) 50 MG tablet Take 50 mg by mouth every 8 hours as needed for Pain. amitriptyline (ELAVIL) 25 MG tablet Take 25 mg by mouth daily Pt takes 25 mg in am and 50 mg at night    metoprolol tartrate (LOPRESSOR) 50 MG tablet Take 50 mg by mouth 2 times daily    ipratropium-albuterol (DUONEB) 0.5-2.5 (3) MG/3ML SOLN nebulizer solution Inhale 1 vial into the lungs every 6 hours as needed for Shortness of Breath    aspirin 81 MG chewable tablet Take 81 mg by mouth daily    amLODIPine (NORVASC) 10 MG tablet Take 10 mg by mouth daily    metFORMIN (GLUCOPHAGE) 1000 MG tablet Take 1,000 mg by mouth daily (with breakfast)     atorvastatin (LIPITOR) 40 MG tablet Take 80 mg by mouth daily     albuterol (PROVENTIL HFA) 108 (90 BASE) MCG/ACT inhaler Inhale 2 puffs into the lungs every 4 hours as needed for Wheezing     Exam  Blood pressure (!) 145/78, pulse 81, temperature 98 °F (36.7 °C), temperature source Oral, resp. rate 23, height 5' 5\" (1.651 m), weight 184 lb 1.4 oz (83.5 kg), SpO2 97 %, not currently breastfeeding. Constitutional    Vital signs: BP, HR, and RR reviewed   General alert, no distress  Eyes: unable to visualize the fundi  Cardiovascular: no peripheral edema. Psychiatric: cooperative with examination, no psychotic behavior noted. Neurologic  Mental status:   orientation to person, place. Attention intact as able to attend well to the exam     Language fluent in conversation   Comprehension intact; follows simple commands  Cranial nerves:   CN2: visual fields full   CN 3,4,6: extraocular muscles intact  CN7: perhaps slight L facial weakness though activates well. No dysarthria. CN8: hearing grossly intact  CN12: tongue midline with protrusion  Strength: good strength in all 4 extremities   Sensory: light touch intact in all 4 extremities. Cerebellar/coordination: finger nose finger normal without ataxia  Tone: normal in all 4 extremities  Gait: deferred at this time for safety. ROS  Constitutional- No weight loss or fevers  Eyes- No diplopia. No photophobia. Ears/nose/throat- No dysphagia. No Dysarthria  Cardiovascular- No palpitations. No chest pain  Respiratory- No dyspnea. No Cough  Gastrointestinal- No Abdominal pain. No Vomiting. Genitourinary- No incontinence. No urinary retention  Musculoskeletal- No myalgia. No arthralgia  Skin- No rash. No easy bruising. Psychiatric- No depression. No anxiety  Endocrine- No diabetes. No thyroid issues. Hematologic- No bleeding difficulty. No fatigue  Neurologic- No weakness. No Headache. Labs  Glucose 197  Na 138  K 3.6  BUN 19  Cr 1.5    ALT 11  AST 17    WBC 8.0K  Hg 10.7  Platelets 140    AEF5G (Andrey) 6.5    Studies  MRI brain w/o 3/13/23, independently reviewed  Impression   Small acute infarcts within both cerebral hemispheres. CTA head/neck 3/11/23, independently reviewed  Impression   1. Focal severe stenosis/near occlusion of the left middle cerebral artery   M1 bifurcation. 2.  Focal severe stenosis/near occlusion of a right middle cerebral artery M2   branch. 2.  No hemodynamically significant stenosis in the bilateral cervical   internal carotid arteries per NASCET criteria. TTE 3/13/23   Normal left ventricle size, wall thickness, and systolic function with an   estimated ejection fraction of 55-60%.  No regional wall motion abnormalities   are seen. The right ventricle is normal in size and function. Moderate mitral regurgitation. Trivial tricuspid regurgitation. Estimated pulmonary artery systolic pressure is normal at 30 mmHg assuming a right atrial pressure of 3 mmHg. A bubble study was performed and fails to show evidence of right to left   shunting. Normal L atrium. Impression/Recommendations  Small acute bilateral infarcts. Severe bilateral MCA stenosis. Hypertension. Diabetes. Given the degree of her intracranial atherosclerotic disease, would recommend 325 mg asa/75 mg Plavix daily for 90 days then transition to monotherapy with a full dose aspirin; assuming no medical contraindications. Statin. Check lipid panel. Goal LDL < 70. Adjust as needed. Would recommend a 30 day event monitor. BP control. We will sign off. Follow up w/ neurology in 3 months. Please call w/ any questions or concerns. Thank you.      Wen Watkins NP  [address] [place] [address] [address] Neurology    A copy of this note was provided for Dr Veronica Acosta MD

## 2023-03-14 NOTE — CARE COORDINATION
Discharge Planning:   Call received from Patient regarding emergency response system. Encouraged patient to call insurance and confirmed I would place referral with [place] [facility]. STEPHANIE Galarza, LSW, Social Work/Case Management   [phone]  Electronically signed by STEPHANIE Galarza on 3/14/2023 at 8:33 AM     Met with patient at bedside. Discussed discharge plan. Patient confirmed they have family to transport home today. Confirmed patient is in process with contacting insurance regarding benefits for emergency alert system. Notified patient I will place referral with [place] [facility] for resources. Per chart review, Neurology has signed off, and cardiology outpatient appointment has been scheduled. Called to [place], RN and confirmed she will message Dr. Bailey Tierney for discharge order.      Electronically signed by STEPHANIE Galarza on 3/14/2023 at 3:19 PM

## 2023-03-14 NOTE — PLAN OF CARE
Problem: Discharge Planning  Goal: Discharge to home or other facility with appropriate resources  3/14/2023 0212 by Aleisha Sparrow RN  Outcome: Progressing     Problem: Pain  Goal: Verbalizes/displays adequate comfort level or baseline comfort level  3/14/2023 0212 by Aleisha Sparrow RN  Outcome: Progressing     Problem: Safety - Adult  Goal: Free from fall injury  3/14/2023 0212 by Aleisha Sparrow RN  Outcome: Progressing     Problem: Neurosensory - Adult  Goal: Achieves stable or improved neurological status  3/14/2023 0212 by Aleisha Sparrow RN  Outcome: Progressing     Problem: Neurosensory - Adult  Goal: Achieves maximal functionality and self care  3/14/2023 0212 by Aleisha Sparrow RN  Outcome: Progressing

## 2023-03-15 ENCOUNTER — CARE COORDINATION (OUTPATIENT)
Dept: CASE MANAGEMENT | Age: 58
End: 2023-03-15

## 2023-03-15 NOTE — DISCHARGE SUMMARY
[address] [place] [address]                               DISCHARGE SUMMARY    PATIENT NAME: Alondra Canales                    :        1965  MED REC NO:   [id number]                          ROOM:       56  ACCOUNT NO:   [de-identified]                           ADMIT DATE: 2023  PROVIDER:     Pam Clayton MD                  DISCHARGE DATE:  2023    DISCHARGE DIAGNOSES:  Acute slurred speech with left facial droop, CVA,  bilateral cerebellar infarct, severe cerebral artery stenosis, diabetes  mellitus type 2, obesity, hypertension, hyperlipidemia, and cigarette  smoking. DISCHARGE SUMMARY:  This 61-year-old female patient came to emergency  room with acute slurred speech and some left facial droop. The patient  was seen by Neurology. The patient had a CTA of the cerebral artery,  which showed severe narrowing of her middle cerebral artery. This  patient also had old CVA and possibly new one. The patient had MRI  which confirmed two small cerebellar infarct. The patient has  echocardiogram, which revealed mitral and tricuspid regurgitation, the  ejection fraction was normal.  The patient has follow up with Neurology,  recommended a 30-day Holter monitoring, aspirin and 90 days of Plavix  therapy. The patient would like to return home and Neurology agreed to  discharge and the patient will be discharged home and follow up with PCP  and Neurology and _____ 30-day event monitor. CONDITION ON DISCHARGE:  Stable. COMPLICATIONS:  None. DISCHARGE MEDICATIONS:  See the reconciliation sheet. The patient is working on her smoking cessation and will continue  medication and follow up with PCP in two weeks. I spent more than 30 minutes with the discharge instruction and  paperwork.         Wu Pereira MD    D: 2023 16:41:58       T: 2023 21:36:02     [email or web address]  Job#: [id number] Doc#: [id number]    CC:

## 2023-03-15 NOTE — CARE COORDINATION
[address] Care Transitions Initial Follow Up Call    Call within 2 business days of discharge: Yes    Patient Current Location:  Home: [address] [place] [address] [phone] [id number]    N Care Coordinator contacted the patient by telephone to perform post hospital discharge assessment. Verified name and  with patient as identifiers. Provided introduction to self, and explanation of the LPN Care Coordinator role. Patient: Cliff Gutierrez Patient : 1965   MRN: [id number]  Reason for Admission: Acute cerebrovascular accident   Discharge Date: 3/14/23 RARS: Readmission Risk Score: 13.4      Last Discharge [address]       Date Complaint Diagnosis Description Type Department Provider    3/11/23 Aphasia Slurring of speech . .. ED to Hosp-Admission (Discharged) (ADMITTED) [facility] Keturah Reagan MD; [place] Reasons,... Was this an external facility discharge? No Discharge Facility: [address] [place] [address],[address] Floor to patient she was unable to talk at this time. She was in the car going to pickup medication. Patient requested a call back at another time. Will try again.       Care Transitions 24 Hour Call    Care Transitions Interventions       Follow Up  Future Appointments   Date Time Provider Martha Orlando   3/16/2023 10:00 AM SCHEDULE, [facility] NEHEMIAS Gold LPN

## 2023-03-16 ENCOUNTER — CARE COORDINATION (OUTPATIENT)
Dept: CASE MANAGEMENT | Age: 58
End: 2023-03-16

## 2023-03-16 ENCOUNTER — TELEPHONE (OUTPATIENT)
Dept: CARDIOLOGY CLINIC | Age: 58
End: 2023-03-16

## 2023-03-16 ENCOUNTER — NURSE ONLY (OUTPATIENT)
Dept: CARDIOLOGY CLINIC | Age: 58
End: 2023-03-16

## 2023-03-16 DIAGNOSIS — R47.81 SLURRING OF SPEECH: ICD-10-CM

## 2023-03-16 DIAGNOSIS — I63.9 ACUTE CEREBROVASCULAR ACCIDENT (CVA) (HCC): ICD-10-CM

## 2023-03-16 NOTE — CARE COORDINATION
[facility] Care Transitions Initial Follow Up Call    Call within 2 business days of discharge: Yes      Patient: Maria Esther Jaramillo Patient : 1965   MRN: [id number]  Reason for Admission: Slurred speech and left facial droop  Discharge Date: 3/14/23 RARS: Readmission Risk Score: 13.4      Last Discharge  Street       Date Complaint Diagnosis Description Type Department Provider    3/11/23 Aphasia Slurring of speech . .. ED to Hosp-Admission (Discharged) (ADMITTED) [facility] Jay Rucker MD; Trip Manuel,... Was this an external facility discharge? No Discharge Facility: [facility] to be reviewed by the provider   Additional needs identified to be addressed with provider: No                 Method of communication with provider: none. 2nd and final attempt at CT discharge phone call. Unable to reach patient. Left message. Informed imani this would be the final CTN outreach. Care Transitions 24 Hour Call    Care Transitions Interventions         Follow Up  No future appointments.      Kadeem Peck RN BSN  Care Transition Nurse  [phone]

## 2023-04-19 ENCOUNTER — TELEPHONE (OUTPATIENT)
Dept: CARDIOLOGY CLINIC | Age: 58
End: 2023-04-19

## 2023-04-21 DIAGNOSIS — R47.81 SLURRED SPEECH: Primary | ICD-10-CM

## 2023-04-26 ENCOUNTER — HOSPITAL ENCOUNTER (EMERGENCY)
Age: 58
Discharge: HOME | End: 2023-04-26
Payer: MEDICARE

## 2023-04-26 VITALS
HEIGHT: 65 IN | TEMPERATURE: 98.5 F | DIASTOLIC BLOOD PRESSURE: 82 MMHG | WEIGHT: 185.19 LBS | HEART RATE: 66 BPM | SYSTOLIC BLOOD PRESSURE: 138 MMHG | RESPIRATION RATE: 16 BRPM | OXYGEN SATURATION: 99 % | BODY MASS INDEX: 30.85 KG/M2

## 2023-04-26 DIAGNOSIS — Z86.73 HISTORY OF CVA IN ADULTHOOD: ICD-10-CM

## 2023-04-26 DIAGNOSIS — Z79.02 ANTIPLATELET OR ANTITHROMBOTIC LONG-TERM USE: ICD-10-CM

## 2023-04-26 DIAGNOSIS — Z72.0 TOBACCO ABUSE: ICD-10-CM

## 2023-04-26 DIAGNOSIS — R23.3 BRUISING, SPONTANEOUS: Primary | ICD-10-CM

## 2023-04-26 LAB
ALBUMIN SERPL-MCNC: 4 G/DL (ref 3.4–5)
ALBUMIN/GLOB SERPL: 1.2 {RATIO} (ref 1.1–2.2)
ALP SERPL-CCNC: 121 U/L (ref 40–129)
ALT SERPL-CCNC: 7 U/L (ref 10–40)
ANION GAP SERPL CALCULATED.3IONS-SCNC: 11 MMOL/L (ref 3–16)
APTT BLD: 27.3 SEC (ref 22.7–35.9)
AST SERPL-CCNC: 14 U/L (ref 15–37)
BACTERIA URNS QL MICRO: NORMAL /HPF
BASOPHILS # BLD: 0.1 K/UL (ref 0–0.2)
BASOPHILS NFR BLD: 1.8 %
BILIRUB SERPL-MCNC: <0.2 MG/DL (ref 0–1)
BILIRUB UR QL STRIP.AUTO: NEGATIVE
BUN SERPL-MCNC: 14 MG/DL (ref 7–20)
CALCIUM SERPL-MCNC: 9.3 MG/DL (ref 8.3–10.6)
CHLORIDE SERPL-SCNC: 104 MMOL/L (ref 99–110)
CLARITY UR: CLEAR
CO2 SERPL-SCNC: 23 MMOL/L (ref 21–32)
COLOR UR: YELLOW
CREAT SERPL-MCNC: 1.3 MG/DL (ref 0.6–1.1)
DEPRECATED RDW RBC AUTO: 15 % (ref 12.4–15.4)
EOSINOPHIL # BLD: 0.6 K/UL (ref 0–0.6)
EOSINOPHIL NFR BLD: 9.1 %
EPI CELLS #/AREA URNS AUTO: 1 /HPF (ref 0–5)
GFR SERPLBLD CREATININE-BSD FMLA CKD-EPI: 47 ML/MIN/{1.73_M2}
GLUCOSE SERPL-MCNC: 153 MG/DL (ref 70–99)
GLUCOSE UR STRIP.AUTO-MCNC: NEGATIVE MG/DL
HCT VFR BLD AUTO: 32.9 % (ref 36–48)
HGB BLD-MCNC: 10.4 G/DL (ref 12–16)
HGB UR QL STRIP.AUTO: NEGATIVE
HYALINE CASTS #/AREA URNS AUTO: 0 /LPF (ref 0–8)
INR PPP: 0.9 (ref 0.84–1.16)
KETONES UR STRIP.AUTO-MCNC: NEGATIVE MG/DL
LEUKOCYTE ESTERASE UR QL STRIP.AUTO: NEGATIVE
LIPASE SERPL-CCNC: 34 U/L (ref 13–60)
LYMPHOCYTES # BLD: 1.9 K/UL (ref 1–5.1)
LYMPHOCYTES NFR BLD: 31.6 %
MCH RBC QN AUTO: 28.2 PG (ref 26–34)
MCHC RBC AUTO-ENTMCNC: 31.6 G/DL (ref 31–36)
MCV RBC AUTO: 89.4 FL (ref 80–100)
MONOCYTES # BLD: 0.4 K/UL (ref 0–1.3)
MONOCYTES NFR BLD: 5.9 %
NEUTROPHILS # BLD: 3.1 K/UL (ref 1.7–7.7)
NEUTROPHILS NFR BLD: 51.6 %
NITRITE UR QL STRIP.AUTO: NEGATIVE
PH UR STRIP.AUTO: 6 [PH] (ref 5–8)
PLATELET # BLD AUTO: 463 K/UL (ref 135–450)
PMV BLD AUTO: 8.7 FL (ref 5–10.5)
POTASSIUM SERPL-SCNC: 4.3 MMOL/L (ref 3.5–5.1)
PROT SERPL-MCNC: 7.3 G/DL (ref 6.4–8.2)
PROT UR STRIP.AUTO-MCNC: ABNORMAL MG/DL
PROTHROMBIN TIME: 12.2 SEC (ref 11.5–14.8)
RBC # BLD AUTO: 3.68 M/UL (ref 4–5.2)
RBC CLUMPS #/AREA URNS AUTO: 0 /HPF (ref 0–4)
SODIUM SERPL-SCNC: 138 MMOL/L (ref 136–145)
SP GR UR STRIP.AUTO: 1.02 (ref 1–1.03)
UA COMPLETE W REFLEX CULTURE PNL UR: ABNORMAL
UA DIPSTICK W REFLEX MICRO PNL UR: YES
URN SPEC COLLECT METH UR: ABNORMAL
UROBILINOGEN UR STRIP-ACNC: 0.2 E.U./DL
WBC # BLD AUTO: 6.1 K/UL (ref 4–11)
WBC #/AREA URNS AUTO: 1 /HPF (ref 0–5)

## 2023-04-26 PROCEDURE — 85730 THROMBOPLASTIN TIME PARTIAL: CPT

## 2023-04-26 PROCEDURE — 83690 ASSAY OF LIPASE: CPT

## 2023-04-26 PROCEDURE — 80053 COMPREHEN METABOLIC PANEL: CPT

## 2023-04-26 PROCEDURE — 36415 COLL VENOUS BLD VENIPUNCTURE: CPT

## 2023-04-26 PROCEDURE — 99283 EMERGENCY DEPT VISIT LOW MDM: CPT

## 2023-04-26 PROCEDURE — 85610 PROTHROMBIN TIME: CPT

## 2023-04-26 PROCEDURE — 81001 URINALYSIS AUTO W/SCOPE: CPT

## 2023-04-26 PROCEDURE — 85025 COMPLETE CBC W/AUTO DIFF WBC: CPT

## 2023-04-26 ASSESSMENT — PAIN - FUNCTIONAL ASSESSMENT
PAIN_FUNCTIONAL_ASSESSMENT: 0-10
PAIN_FUNCTIONAL_ASSESSMENT: NONE - DENIES PAIN

## 2023-04-26 ASSESSMENT — PAIN DESCRIPTION - FREQUENCY: FREQUENCY: CONTINUOUS

## 2023-04-26 ASSESSMENT — PAIN DESCRIPTION - LOCATION: LOCATION: ARM

## 2023-04-26 ASSESSMENT — PAIN SCALES - GENERAL: PAINLEVEL_OUTOF10: 5

## 2023-04-26 ASSESSMENT — PAIN DESCRIPTION - PAIN TYPE: TYPE: ACUTE PAIN

## 2023-04-26 ASSESSMENT — PAIN DESCRIPTION - ORIENTATION: ORIENTATION: RIGHT

## 2023-04-26 NOTE — ED TRIAGE NOTES
Pt to ED with complaint of bruises on right arm and back of left leg for over a week. Pt states she recently had a stroke and was started on aspirin and plavix. Pt denies any injury or trauma to arm. Pt states bruises have \"knots\" on them. No other complaints.

## 2023-04-26 NOTE — DISCHARGE INSTRUCTIONS
Continue your current medications as directed. Follow-up with your PCP as well as with listed hematologist as directed. Return for high fever, sudden vomiting, severe pain, uncontrolled bleeding, new or worsening symptoms.

## 2023-04-26 NOTE — ED PROVIDER NOTES
153.  White blood cell count normal 6.1. H&H stable at 10.4 and 32.9 respectively. Platelets mildly increased to 463. Urinalysis is negative for infection. Patient reassessed and notified of test results. Patient has remained hemodynamically stable throughout ED visit. At this time is no evidence of any life-threatening or emergent additions requiring immediate intervention. Shared decision making employed and patient is agreeable to discharge with emphasis on close outpatient follow-up. Patient agrees to follow-up with her PCP as well as with listed hematologist as directed. She is advised continue taking her current medications as directed. She agrees return for high fever, incessant vomiting severe pain, new or worsening symptoms. She is discharged home in stable condition. Approximately 6 minutes of smoking cessation counseling provided during this ED visit. Benefits of smoking cessation discussed. Outpatient resources for help with smoking cessation provided. I estimate there is low risk for sepsis, MI, stroke, tamponade, PTX, toxicity, or other life threatening etiology. Given the best available information and clinical assessment I estimate the risk of hospitalization to be greater than risk of treatment at home. We discussed and I explained the risk could rapidly change and return precautions and instructions given. Discharge disposition is reasonable. I am the Primary Clinician of Record. FINAL IMPRESSION      1. Bruising, spontaneous    2. Antiplatelet or antithrombotic long-term use    3. History of CVA in adulthood    4.  Tobacco abuse          DISPOSITION/PLAN     DISPOSITION Decision To Discharge 04/26/2023 12:58:33 PM      PATIENT REFERRED TO:  MD Rajiv Frank [phone]    In 1 day      [address] [facility]  [address] [place] [address]  [phone]  In 1 day        DISCHARGE MEDICATIONS:  Discharge

## 2023-04-28 ASSESSMENT — ENCOUNTER SYMPTOMS
COUGH: 0
SORE THROAT: 0
CHEST TIGHTNESS: 0
VOICE CHANGE: 0
NAUSEA: 0
SHORTNESS OF BREATH: 0
ABDOMINAL PAIN: 0
BACK PAIN: 0
WHEEZING: 0
VOMITING: 0

## 2023-05-03 NOTE — TELEPHONE ENCOUNTER
Spoke with patient advised of monitor results. Discussed ILR implantation for long term monitoring (up to 4.5 years) for presence of atrial fibrillation/flutter which may have contributed to her CVA.   Scheduled appointment to see Dr. Leeann Stahl on 05/15/2023 at 2:30 pm.

## 2023-05-15 ENCOUNTER — OFFICE VISIT (OUTPATIENT)
Dept: CARDIOLOGY CLINIC | Age: 58
End: 2023-05-15
Payer: MEDICARE

## 2023-05-15 VITALS
HEART RATE: 63 BPM | HEIGHT: 65 IN | SYSTOLIC BLOOD PRESSURE: 125 MMHG | WEIGHT: 185 LBS | DIASTOLIC BLOOD PRESSURE: 70 MMHG | OXYGEN SATURATION: 99 % | BODY MASS INDEX: 30.82 KG/M2

## 2023-05-15 DIAGNOSIS — I10 ESSENTIAL HYPERTENSION: ICD-10-CM

## 2023-05-15 DIAGNOSIS — Z86.73 HISTORY OF CVA (CEREBROVASCULAR ACCIDENT): Primary | ICD-10-CM

## 2023-05-15 DIAGNOSIS — Z79.4 TYPE 2 DIABETES MELLITUS WITHOUT COMPLICATION, WITH LONG-TERM CURRENT USE OF INSULIN (HCC): ICD-10-CM

## 2023-05-15 DIAGNOSIS — E11.9 TYPE 2 DIABETES MELLITUS WITHOUT COMPLICATION, WITH LONG-TERM CURRENT USE OF INSULIN (HCC): ICD-10-CM

## 2023-05-15 PROCEDURE — 3078F DIAST BP <80 MM HG: CPT | Performed by: INTERNAL MEDICINE

## 2023-05-15 PROCEDURE — 4004F PT TOBACCO SCREEN RCVD TLK: CPT | Performed by: INTERNAL MEDICINE

## 2023-05-15 PROCEDURE — G8417 CALC BMI ABV UP PARAM F/U: HCPCS | Performed by: INTERNAL MEDICINE

## 2023-05-15 PROCEDURE — 93000 ELECTROCARDIOGRAM COMPLETE: CPT | Performed by: INTERNAL MEDICINE

## 2023-05-15 PROCEDURE — 3017F COLORECTAL CA SCREEN DOC REV: CPT | Performed by: INTERNAL MEDICINE

## 2023-05-15 PROCEDURE — 99205 OFFICE O/P NEW HI 60 MIN: CPT | Performed by: INTERNAL MEDICINE

## 2023-05-15 PROCEDURE — 3074F SYST BP LT 130 MM HG: CPT | Performed by: INTERNAL MEDICINE

## 2023-05-15 PROCEDURE — G8427 DOCREV CUR MEDS BY ELIG CLIN: HCPCS | Performed by: INTERNAL MEDICINE

## 2023-05-15 PROCEDURE — 2022F DILAT RTA XM EVC RTNOPTHY: CPT | Performed by: INTERNAL MEDICINE

## 2023-05-15 PROCEDURE — 3046F HEMOGLOBIN A1C LEVEL >9.0%: CPT | Performed by: INTERNAL MEDICINE

## 2023-05-15 RX ORDER — ASPIRIN 81 MG/1
81 TABLET ORAL DAILY
Qty: 90 TABLET | Refills: 1 | COMMUNITY
Start: 2023-05-15

## 2023-05-15 NOTE — PROGRESS NOTES
Cardiac Electrophysiology Consultation   Date: 5/15/2023  Reason for Consultation: CVA  Consult Requesting Physician: Kellie Landaverde MD  Primary Care Physician: Kellie Landaverde MD    Chief Complaint:   Chief Complaint   Patient presents with    New Patient     No cardiac issues / had stroke       HPI: Robert Castro is a 59 y.o. patient with a history of asthma, type 2 diabetes, hyperlipidemia, essential hypertension and CVA. She wore a 30 day monitor from 03/17/2023-04/15/2023 to assess for underlying arrhthymias which may have contributed to her CVA, the monitor showed NSR. No atrial fibrillation,  no atrial flutter was seen. Today, she presents to office to discuss ILR implantation. She report she had TIA in 2014 which effected her speech in 2016 had two minor strokes and most recent stroke in March 2023 when she noted facial droop and aphasia, when EMS arrived symptoms had resolved. EKG today shows SR. She states that she has intentionally lost a lot of weight. She is compliant with his medications and tolerating them well. She denies chest pain/pressure, tightness, edema, shortness of breath, heart racing, palpitations, lightheadedness, dizziness, syncope, presyncope,  PND or orthopnea.        Past Medical History:   Diagnosis Date    Asthma     Back pain     Cerebral artery occlusion with cerebral infarction ([address]     Diabetes mellitus ([facility] [address]     Hyperlipidemia     Hypertension     Spinal cord stimulator status         Past Surgical History:   Procedure Laterality Date    ENDOSCOPY, COLON, DIAGNOSTIC      OTHER SURGICAL HISTORY  06/23/2017    EGD with biopsy    PANCREAS SURGERY      [place] INSJ/RPLCMT SPI NPGR DIR/INDUXIVE COUPLING N/A 8/21/2018    REPLACEMENT / MOVE SPINAL CORD STIMULATOR BATTERY performed by Kody Bills MD at [id number] [facility] ENDOSCOPY N/A 4/29/2022    EGD ESOPHAGOGASTRODUODENOSCOPY ULTRASOUND performed by Delon Garcia

## 2023-05-15 NOTE — PATIENT INSTRUCTIONS
If you have any question regarding your loop implant please contact Nurse Aydee at [phone]. Loop Recorder Implantation Pre Procedure Instructions     Date:____________________________     Fady Hernandez at:_________________________     Procedure time:_____________________        The morning of your procedure you will park in the [facility] AT [facility] and report directly to the cath lab to check in. At the information desk stay right and go all the way to the end of the pope, this will take you directly to your check in desk for the cath lab. Pre-Procedure Instructions  Do not eat or drink anything for two hours prior to your procedure. Do not use any lotions, creams or perfume the morning of procedure. I will call you and instruct if you need to hold Clopidogrel for 5 days prior. You do NOT need to hold your Asprin. If you have a smart phone (apple or android)please bring with you the day of the procedure as well as your yamile store password (android or apple). If you do not have a smart phone that is ok as you will be issued a base station which you will place at your bedside. Cath lab will provide you with all post procedure instructions. You will follow up one week after implantation for implantation site check to make sure it is healing well.

## 2023-06-07 PROBLEM — J18.9 ATYPICAL PNEUMONIA: Status: ACTIVE | Noted: 2023-06-07

## 2023-06-21 ENCOUNTER — CARE COORDINATION (OUTPATIENT)
Dept: CASE MANAGEMENT | Age: 58
End: 2023-06-21

## 2023-06-21 ENCOUNTER — TELEPHONE (OUTPATIENT)
Dept: CARDIOLOGY CLINIC | Age: 58
End: 2023-06-21

## 2023-06-21 DIAGNOSIS — Z86.73 HISTORY OF CVA (CEREBROVASCULAR ACCIDENT): ICD-10-CM

## 2023-06-21 DIAGNOSIS — Z79.4 TYPE 2 DIABETES MELLITUS WITHOUT COMPLICATION, WITH LONG-TERM CURRENT USE OF INSULIN (HCC): Primary | ICD-10-CM

## 2023-06-21 DIAGNOSIS — E11.9 TYPE 2 DIABETES MELLITUS WITHOUT COMPLICATION, WITH LONG-TERM CURRENT USE OF INSULIN (HCC): Primary | ICD-10-CM

## 2023-06-21 NOTE — CARE COORDINATION
[facility] Care Transitions Follow Up Call    Patient Current Location:  Home: [address] [place] [id number]    Care Transition Nurse contacted the patient by telephone to follow up after admission. Verified name and  with patient as identifiers. Patient: Macy Brannon  Patient : 1965   MRN: [id number]  Reason for Admission: PNA  Discharge Date: 23 RARS: Readmission Risk Score: 12.5      Needs to be reviewed by the provider   Additional needs identified to be addressed with provider: No  none             Method of communication with provider: none. Spoke with pt who states she is doing better. Denies any SOB or CP. States her appetite is back to normal along with BMs. Denies making a HFU with PCP but states she will do this. Denies any other questions or concerns. Follow Up  No future appointments. Non-[facility] follow up appointment(s): n/a    Care Transition Nurse reviewed red flags with patient and discussed any barriers to care and/or understanding of plan of care after discharge. Discussed appropriate site of care based on symptoms and resources available to patient including: PCP. The patient agrees to contact the PCP office for questions related to their healthcare.       Care Transitions Subsequent and Final Call    Subsequent and Final Calls  Care Transitions Interventions  Other Interventions:               YELENA Chauhan, RN   Care Transition Nurse  Mobile: [phone]

## 2023-06-21 NOTE — TELEPHONE ENCOUNTER
Please reach out to patient and schedule loop recorder implant with Dr. Klaudia Carias. Anesthesia is NOT needed         Loop Recorder Implantation Pre Procedure Instructions     Date:____________________________     Suni Lucia at:_________________________     Procedure time:_____________________        The morning of your procedure you will park in the [facility] AT [facility] and report directly to the cath lab to check in. At the information desk stay right and go all the way to the end of the pope, this will take you directly to your check in desk for the cath lab. Pre-Procedure Instructions  Do not eat or drink anything for two hours prior to your procedure. Do not use any lotions, creams or perfume the morning of procedure. Hold Clopidogrel for 3 days prior. You do NOT need to hold your Asprin. If you have a smart phone (apple or android)please bring with you the day of the procedure as well as your yamile store password (android or apple). If you do not have a smart phone that is ok as you will be issued a base station which you will place at your bedside. Cath lab will provide you with all post procedure instructions. You will follow up one week after implantation for implantation site check to make sure it is healing well.

## 2023-06-21 NOTE — TELEPHONE ENCOUNTER
Spoke with the patient and got her scheduled for procedure. We went over instructions below and she verbailzed understanding. Loop Recorder Implantation Pre Procedure Instructions     Date: 7/12/2023     Arrive at: 12:30 pm   Procedure time: 1:30 pm         The morning of your procedure you will park in the [facility] AT [facility] and report directly to the cath lab to check in. At the information desk stay right and go all the way to the end of the pope, this will take you directly to your check in desk for the cath lab. Pre-Procedure Instructions  Do not eat or drink anything for two hours prior to your procedure. Do not use any lotions, creams or perfume the morning of procedure. Hold Clopidogrel for 3 days prior. You do NOT need to hold your Asprin. If you have a smart phone (apple or android)please bring with you the day of the procedure as well as your yamile store password (android or apple). If you do not have a smart phone that is ok as you will be issued a base station which you will place at your bedside. Cath lab will provide you with all post procedure instructions. You will follow up one week after implantation for implantation site check to make sure it is healing well.         Vannesa Guerra updated / emailed cath lab

## 2023-07-12 ENCOUNTER — HOSPITAL ENCOUNTER (OUTPATIENT)
Dept: CARDIAC CATH/INVASIVE PROCEDURES | Age: 58
Discharge: HOME | End: 2023-07-12
Payer: MEDICARE

## 2023-07-12 DIAGNOSIS — Z86.73 HISTORY OF CVA (CEREBROVASCULAR ACCIDENT): ICD-10-CM

## 2023-07-12 PROCEDURE — 33285 INSJ SUBQ CAR RHYTHM MNTR: CPT | Performed by: INTERNAL MEDICINE

## 2023-07-12 PROCEDURE — 2500000003 HC RX 250 WO HCPCS

## 2023-07-12 PROCEDURE — C1764 EVENT RECORDER, CARDIAC: HCPCS

## 2023-07-12 PROCEDURE — 6360000002 HC RX W HCPCS

## 2023-07-12 PROCEDURE — 33285 INSJ SUBQ CAR RHYTHM MNTR: CPT

## 2023-07-12 RX ORDER — SODIUM CHLORIDE 0.9 % (FLUSH) 0.9 %
5-40 SYRINGE (ML) INJECTION PRN
OUTPATIENT
Start: 2023-07-12

## 2023-07-12 RX ORDER — SODIUM CHLORIDE 9 MG/ML
INJECTION, SOLUTION INTRAVENOUS PRN
OUTPATIENT
Start: 2023-07-12

## 2023-07-12 RX ORDER — SODIUM CHLORIDE 0.9 % (FLUSH) 0.9 %
5-40 SYRINGE (ML) INJECTION EVERY 12 HOURS SCHEDULED
OUTPATIENT
Start: 2023-07-12

## 2023-07-12 NOTE — H&P
Cardiac Electrophysiology Consultation   Date: 7/12/2023  Reason for Consultation: CVA  Consult Requesting Physician: Dimitry Medel MD  Primary Care Physician: Dimitry Medel MD     Chief Complaint:        Chief Complaint   Patient presents with    New Patient       No cardiac issues / had stroke       HPI: Mona Shabazz is a 59 y.o. patient with a history of asthma, type 2 diabetes, hyperlipidemia, essential hypertension and CVA. She wore a 30 day monitor from 03/17/2023-04/15/2023 to assess for underlying arrhthymias which may have contributed to her CVA, the monitor showed NSR. No atrial fibrillation,  no atrial flutter was seen. Today, she presents to office to discuss ILR implantation. She report she had TIA in 2014 which effected her speech in 2016 had two minor strokes and most recent stroke in March 2023 when she noted facial droop and aphasia, when EMS arrived symptoms had resolved. EKG today shows SR. She states that she has intentionally lost a lot of weight. She is compliant with his medications and tolerating them well. She denies chest pain/pressure, tightness, edema, shortness of breath, heart racing, palpitations, lightheadedness, dizziness, syncope, presyncope,  PND or orthopnea.          Past Medical History        Past Medical History:   Diagnosis Date    Asthma      Back pain      Cerebral artery occlusion with cerebral infarction ([address])      Diabetes mellitus ([address])      Hyperlipidemia      Hypertension      Spinal cord stimulator status              Past Surgical History         Past Surgical History:   Procedure Laterality Date    ENDOSCOPY, COLON, DIAGNOSTIC        OTHER SURGICAL HISTORY   06/23/2017     EGD with biopsy    PANCREAS SURGERY        [place] INSJ/RPLCMT SPI NPGR DIR/INDUXIVE COUPLING N/A 8/21/2018     REPLACEMENT / MOVE SPINAL CORD STIMULATOR BATTERY performed by Jose Lott MD at [facility]

## 2023-07-12 NOTE — DISCHARGE INSTRUCTIONS
LOOP RECORDER PLACEMENT        Keep dressing in place Left chest wall until your follow up visit with doctor. Keep dressing dry     May shower as long as keep back to shower. Please contact the office if you notice any signs of infection:  [phone]      Redness / swelling at the incision site  Fever  Yellow drainage at the site  Pain                                                                                          MONITOR INSTRUCTIONS    You will go home with a box that includes: Your home monitor, Faab, temporary ID card, and instruction manuals    Plug the home monitor in next to your bedside within 6 ft of your bed. Everything is already programmed; all you need to do is plug it in. The Faab is meant to be carried on you in case you have symptoms. Place the Faab over the device in your chest and push the button. It only places a bookmark at that moment that you were having symptoms and will be transmitted to the office during the hours we programmed it to transmit. If you forget to press the Faab, don't worry it will still transmit the rhythm. The office will still see it. But, its best to get into the habit of pressing the Faab when symptoms occur. The temporary ID card should be kept in your wallet. The device in your chest is metal and will go off in a metal detector. [employer] will send you a hard copy of the ID        Card in the mail. When taking a shower, keep your back to the water and Do Not allow the water to hit your chest directly for one week. If the top bandage should fall off or get wet, Do Not replace it. Please keep the steri-strips in place until your one week follow-up appointment in the office.     You will have a one week follow-up appointment to see a nurse in the office for a wound check to make sure you are healing properly

## 2023-07-12 NOTE — PROCEDURES
[facility]     Electrophysiology Procedure Note       Date of Procedure: 7/12/2023  Patient's Name: Larisa Abdul  YOB: 1965   Medical Record Number: [id number]  Procedure Performed by: Burgess Barb MD    Procedures performed:    Loop recorder implantation  Anesthesia: Local anesthesia care  Level of sedation plan: none  Mallampati airway assessment class: I  ASA class: 1    Indication of the procedure: Syncope, Palpitation   Larisa Abdul is a 59 y.o. female with cerebrovascular accident of unclear etiology. Because the patient is at risk of having atrial dysrhythmia, particularly atrial fibrillation, the decision was made to undergo a procedure that would afford long term rhythm detection. Therefore, the patient has been scheduled to undergo an ILR implantation. Details of procedure:   Procedure's risks, benefits and alternatives of procedure were explained to patient. The risks including, but not limited to, the risks of vascular injury, bleeding, infection, device malfunction, injury to cardiac and surrounding structures (including pneumothorax), stroke, myocardial infarction and death were discussed in detail. The patient was also counseled at length about the risks of saad Covid-19 in the rika-operative and post-operative states including the recovery window of their procedure. The patient was made aware that saad Covid-19 after a surgical procedure may worsen their prognosis for recovering from the virus and lend to a higher morbidity and or mortality risk. In a very rare chance of catastrophic complication that cannot be managed at our hospital, there may be the chance of having to transfer the patient to another hospital system for further care. The patient was given the option of postponing their procedure. The patient was also presented reasonable alternatives to the proposed care, treatment, and services.  The discussion I have had with the patient

## 2023-07-19 ENCOUNTER — NURSE ONLY (OUTPATIENT)
Dept: CARDIOLOGY CLINIC | Age: 58
End: 2023-07-19

## 2023-07-19 DIAGNOSIS — Z95.818 STATUS POST PLACEMENT OF IMPLANTABLE LOOP RECORDER: Primary | ICD-10-CM

## 2023-07-19 DIAGNOSIS — I63.9 ACUTE CEREBROVASCULAR ACCIDENT (CVA) (HCC): ICD-10-CM

## 2023-07-19 NOTE — PROGRESS NOTES
Patient comes in for interrogation of their implanted loop recorder. Interrogation shows battery status good, no new episodes, AT/AF burden 0%, PVC 0.1%. Implanted for Cryptogenic Stroke. Patient remains on Metoprolol. Dressing removed from left chest device site. SS CDI. Pt informed to call office if any redness, swelling, fever, chills, or drainage from site. Pt voiced an understanding. Please see interrogation for more detail. We will continue to follow the Patient remotely.

## 2023-09-22 PROCEDURE — 93298 REM INTERROG DEV EVAL SCRMS: CPT | Performed by: INTERNAL MEDICINE

## 2023-09-22 PROCEDURE — G2066 INTER DEVC REMOTE 30D: HCPCS | Performed by: INTERNAL MEDICINE

## 2023-10-18 NOTE — PROGRESS NOTES
Cardiac Electrophysiology Consultation   Date: 10/19/2023  Reason for Consultation: CVA  Consult Requesting Physician: Jen Argueta MD  Primary Care Physician: Jen Argueta MD    Chief Complaint:   Chief Complaint   Patient presents with    Follow-up     Follow up       HPI: Lula Richmond is a 59 y.o. patient with a history of asthma, type 2 diabetes, hyperlipidemia, essential hypertension and CVA. She wore a 30 day monitor from 03/17/2023-04/15/2023 to assess for underlying arrhthymias which may have contributed to her CVA, the monitor showed NSR. No atrial fibrillation,  no atrial flutter was seen. She was seen in office on 05/15/2023 and ILR implantation was discussed. She report she had TIA in 2014 which effected her speech in 2016 had two minor strokes and most recent stroke in March 2023 when she noted facial droop and aphasia, when EMS arrived symptoms had resolved. EKG today shows SR. She states that she has intentionally lost a lot of weight  She deceided to proceed with ILR implantation. On 07/12/2023 she underwent implantation of [employer] Reveal LINQ Loop recorder. Interval History: Today, she presents to office for follow up. ILR interrogation showed no arrhthymias. She is compliant with her medications and tolerating them well. She denies chest pain/pressure, tightness, edema, shortness of breath, heart racing, palpitations, lightheadedness, dizziness, syncope, presyncope,  PND or orthopnea.     Past Medical History:   Diagnosis Date    Asthma     Back pain     Cerebral artery occlusion with cerebral infarction ([address])     Diabetes mellitus ([address])     Hyperlipidemia     Hypertension     Spinal cord stimulator status         Past Surgical History:   Procedure Laterality Date    ENDOSCOPY, COLON, DIAGNOSTIC      OTHER SURGICAL HISTORY  06/23/2017    EGD with biopsy    PANCREAS SURGERY      OK INSJ/RPLCMT SPI NPGR DIR/INDUXIVE COUPLING N/A 8/21/2018    REPLACEMENT / MOVE SPINAL CORD

## 2023-10-19 ENCOUNTER — OFFICE VISIT (OUTPATIENT)
Dept: CARDIOLOGY CLINIC | Age: 58
End: 2023-10-19
Payer: COMMERCIAL

## 2023-10-19 VITALS
SYSTOLIC BLOOD PRESSURE: 130 MMHG | HEIGHT: 63 IN | DIASTOLIC BLOOD PRESSURE: 76 MMHG | BODY MASS INDEX: 35.44 KG/M2 | OXYGEN SATURATION: 100 % | WEIGHT: 200 LBS | HEART RATE: 67 BPM

## 2023-10-19 DIAGNOSIS — I10 ESSENTIAL HYPERTENSION: ICD-10-CM

## 2023-10-19 DIAGNOSIS — E11.9 TYPE 2 DIABETES MELLITUS WITHOUT COMPLICATION, WITH LONG-TERM CURRENT USE OF INSULIN (HCC): ICD-10-CM

## 2023-10-19 DIAGNOSIS — Z79.4 TYPE 2 DIABETES MELLITUS WITHOUT COMPLICATION, WITH LONG-TERM CURRENT USE OF INSULIN (HCC): ICD-10-CM

## 2023-10-19 DIAGNOSIS — Z86.73 HISTORY OF CVA (CEREBROVASCULAR ACCIDENT): Primary | ICD-10-CM

## 2023-10-19 PROCEDURE — 3075F SYST BP GE 130 - 139MM HG: CPT | Performed by: INTERNAL MEDICINE

## 2023-10-19 PROCEDURE — G8484 FLU IMMUNIZE NO ADMIN: HCPCS | Performed by: INTERNAL MEDICINE

## 2023-10-19 PROCEDURE — 99214 OFFICE O/P EST MOD 30 MIN: CPT | Performed by: INTERNAL MEDICINE

## 2023-10-19 PROCEDURE — G8417 CALC BMI ABV UP PARAM F/U: HCPCS | Performed by: INTERNAL MEDICINE

## 2023-10-19 PROCEDURE — 2022F DILAT RTA XM EVC RTNOPTHY: CPT | Performed by: INTERNAL MEDICINE

## 2023-10-19 PROCEDURE — 4004F PT TOBACCO SCREEN RCVD TLK: CPT | Performed by: INTERNAL MEDICINE

## 2023-10-19 PROCEDURE — 3046F HEMOGLOBIN A1C LEVEL >9.0%: CPT | Performed by: INTERNAL MEDICINE

## 2023-10-19 PROCEDURE — G8427 DOCREV CUR MEDS BY ELIG CLIN: HCPCS | Performed by: INTERNAL MEDICINE

## 2023-10-19 PROCEDURE — 3078F DIAST BP <80 MM HG: CPT | Performed by: INTERNAL MEDICINE

## 2023-10-19 PROCEDURE — 3017F COLORECTAL CA SCREEN DOC REV: CPT | Performed by: INTERNAL MEDICINE

## 2023-10-19 PROCEDURE — 93000 ELECTROCARDIOGRAM COMPLETE: CPT | Performed by: INTERNAL MEDICINE

## 2023-10-19 RX ORDER — LOSARTAN POTASSIUM 25 MG/1
TABLET ORAL
COMMUNITY
Start: 2023-09-27

## 2023-12-01 PROCEDURE — 93298 REM INTERROG DEV EVAL SCRMS: CPT | Performed by: INTERNAL MEDICINE

## 2023-12-01 PROCEDURE — G2066 INTER DEVC REMOTE 30D: HCPCS | Performed by: INTERNAL MEDICINE

## 2024-01-04 PROCEDURE — 93298 REM INTERROG DEV EVAL SCRMS: CPT | Performed by: INTERNAL MEDICINE

## 2024-02-12 PROCEDURE — 93298 REM INTERROG DEV EVAL SCRMS: CPT | Performed by: INTERNAL MEDICINE

## 2024-03-21 PROCEDURE — 93298 REM INTERROG DEV EVAL SCRMS: CPT | Performed by: INTERNAL MEDICINE

## 2024-07-05 PROCEDURE — 93298 REM INTERROG DEV EVAL SCRMS: CPT | Performed by: INTERNAL MEDICINE

## 2025-06-20 PROCEDURE — 93298 REM INTERROG DEV EVAL SCRMS: CPT | Performed by: INTERNAL MEDICINE

## (undated) DEVICE — CHLORAPREP 26ML ORANGE

## (undated) DEVICE — GOWN AURORA NONREINF LG: Brand: MEDLINE INDUSTRIES, INC.

## (undated) DEVICE — MEDI-VAC NON-CONDUCTIVE SUCTION TUBING: Brand: CARDINAL HEALTH

## (undated) DEVICE — SOLUTION IV 1000ML 0.9% SOD CHL

## (undated) DEVICE — 3M™ IOBAN™ 2 ANTIMICROBIAL INCISE DRAPE 6640EZ: Brand: IOBAN™ 2

## (undated) DEVICE — GLOVE SURG SZ 8 L12IN FNGR THK13MIL BRN LTX SYN POLYMER W

## (undated) DEVICE — SYSTEM RECHARGING NEUROSTIMULATOR RECHRG BTTRY PK PWR SUPL

## (undated) DEVICE — FORCEPS BX PED L160CM JAW DIA1.8MM WRK CHN 2MM W/ NDL DISP

## (undated) DEVICE — ELECTRODE PT RET AD L9FT HI MOIST COND ADH HYDRGEL CORDED

## (undated) DEVICE — ENDOSCOPIC KIT 6X3/16 FT COLON W/ 1.1 OZ 2 GWN W/O BRSH

## (undated) DEVICE — 3M™ IOBAN™ 2 ANTIMICROBIAL INCISE DRAPE 6648EZ: Brand: IOBAN™ 2

## (undated) DEVICE — AIR/WATER CLEANING ADAPTER FOR OLYMPUS® GI ENDOSCOPE: Brand: BULLDOG®

## (undated) DEVICE — SURGICAL SET UP - SURE SET: Brand: MEDLINE INDUSTRIES, INC.

## (undated) DEVICE — PLASMABLADE PS210-030S 3.0S LOCK: Brand: PLASMABLADE™

## (undated) DEVICE — Device: Brand: BALLOON3

## (undated) DEVICE — PENCIL ES L3M ROCK SWCH S STL HEX LOK BLDE ELECTRD HOLSTER

## (undated) DEVICE — SURE SET-DOUBLE BASIN-LF: Brand: MEDLINE INDUSTRIES, INC.

## (undated) DEVICE — COVER LT HNDL BLU PLAS

## (undated) DEVICE — SYRINGE IRRIG 60ML SFT PLIABLE BLB EZ TO GRP 1 HND USE W/

## (undated) DEVICE — BLADE ES ELASTOMERIC COAT INSUL DURABLE BEND UPTO 90DEG

## (undated) DEVICE — CONTROLLER NEUROSTIMULATOR HND HELD PRGMR WIRELESS PTM FOR

## (undated) DEVICE — VALVE SUCTION AIR H2O SET ORCA POD + DISP

## (undated) DEVICE — BW-412T DISP COMBO CLEANING BRUSH: Brand: SINGLE USE COMBINATION CLEANING BRUSH

## (undated) DEVICE — TURNOVER KIT RM INF CTRL TECH

## (undated) DEVICE — PACK,BASIC: Brand: MEDLINE

## (undated) DEVICE — MOUTHPIECE ENDOSCP L CTRL OPN AND SIDE PORTS DISP

## (undated) DEVICE — COVER US PRB W15XL244CM ST SURGICAL/INTRAOPERATIVE W/

## (undated) DEVICE — STANDARD HYPODERMIC NEEDLE,POLYPROPYLENE HUB: Brand: MONOJECT

## (undated) DEVICE — SOLUTION IV IRRIG WATER 500ML POUR BRL ST 2F7113

## (undated) DEVICE — GARMENT,MEDLINE,DVT,INT,CALF,MED, GEN2: Brand: MEDLINE

## (undated) DEVICE — GAUZE,SPONGE,4"X4",16PLY,XRAY,STRL,LF: Brand: MEDLINE

## (undated) DEVICE — SHEET, ORTHO, SPLIT, STERILE: Brand: MEDLINE

## (undated) DEVICE — SKIN AFFIX SURG ADHESIVE 72/CS 0.55ML: Brand: MEDLINE

## (undated) DEVICE — SYRINGE MED 10ML TRNSLUC BRL PLUNG BLK MRK POLYPR CTRL

## (undated) DEVICE — INTENDED FOR TISSUE SEPARATION, AND OTHER PROCEDURES THAT REQUIRE A SHARP SURGICAL BLADE TO PUNCTURE OR CUT.: Brand: BARD-PARKER ® CARBON RIB-BACK BLADES

## (undated) DEVICE — DISCONTINUED USE 295735 PROTECTOR ULNAR NERVE DISP

## (undated) DEVICE — GOWN,PREVENTION PLUS,XLN/XL,ST,24/CS: Brand: MEDLINE